# Patient Record
Sex: MALE | Race: WHITE | NOT HISPANIC OR LATINO
[De-identification: names, ages, dates, MRNs, and addresses within clinical notes are randomized per-mention and may not be internally consistent; named-entity substitution may affect disease eponyms.]

---

## 2021-01-25 ENCOUNTER — APPOINTMENT (OUTPATIENT)
Dept: PULMONOLOGY | Facility: CLINIC | Age: 81
End: 2021-01-25
Payer: MEDICARE

## 2021-01-25 VITALS
TEMPERATURE: 98.1 F | DIASTOLIC BLOOD PRESSURE: 79 MMHG | SYSTOLIC BLOOD PRESSURE: 167 MMHG | HEART RATE: 73 BPM | OXYGEN SATURATION: 93 %

## 2021-01-25 PROCEDURE — 99204 OFFICE O/P NEW MOD 45 MIN: CPT

## 2021-01-25 PROCEDURE — 99072 ADDL SUPL MATRL&STAF TM PHE: CPT

## 2021-01-25 RX ORDER — FOLIC ACID 1 MG/1
1 TABLET ORAL
Qty: 90 | Refills: 0 | Status: ACTIVE | COMMUNITY
Start: 2020-07-02

## 2021-01-25 RX ORDER — ROSUVASTATIN CALCIUM 20 MG/1
20 TABLET, FILM COATED ORAL
Qty: 90 | Refills: 0 | Status: ACTIVE | COMMUNITY
Start: 2020-12-18

## 2021-01-25 RX ORDER — TAMSULOSIN HYDROCHLORIDE 0.4 MG/1
0.4 CAPSULE ORAL
Qty: 90 | Refills: 0 | Status: ACTIVE | COMMUNITY
Start: 2020-05-06

## 2021-01-25 NOTE — ASSESSMENT
[FreeTextEntry1] : The patient is a 79 yo male with a hx heavy smoking and multiple vascular issues. The patient has been seen by Dr Masterson and there is concern whether his increased dyspnea from a  pulmonary or cardiac etiology  The patient will need a PFT to determine his pulmonary status and  a comparison to his last PFT. He had a bronchodilator response on the PFT in 2015 but he tried multiple inhalers that did not help him.

## 2021-01-25 NOTE — HISTORY OF PRESENT ILLNESS
[Former] : former [Never] : never [>= 30 pack years] : >= 30 pack years [TextBox_4] : the patient is 80 year M with SOB especially with exertion. He sees DR Masterson for moderate Aortic valve disease. The valve is slightly worse per Dr masterson.  Dr Masterson wants to have the patient have a resp evaluation.  The patient was a heavy smoker up to  1,.5 PPD for 50 yrs  He tried inhalers but they did not help  He has more MEMBRENO this year  He has claudication in the right leg  and he sees Dr LALY celaya He has a renal mass that Dr Rock is monitoring and he has had a triple CABG in [facility] by Juancho García 7 years ago   The patient has no occupational exposures as he worked with [employer]  He is s/p carotid surg 2 X on the left   15 yrs ago  He has an abd aortic aneurysm  He has no hx of a Ct of the chest  last PFt was 2015  [TextBox_11] : 1.5 [TextBox_13] : 50 [YearQuit] : 2013

## 2021-01-25 NOTE — REVIEW OF SYSTEMS
[Postnasal Drip] : postnasal drip [Edentulous] : edentulous [Cough] : no cough [Hemoptysis] : no hemoptysis [Chest Tightness] : no chest tightness [Sputum] : no sputum [Dyspnea] : dyspnea [SOB on Exertion] : sob on exertion [Claudication] : claudication [Nocturia] : nocturia [Frequency] : dysuria [Arthralgias] : arthralgias [Back Pain] : back pain [Diabetes] : no diabetes [Thyroid Problem] : no thyroid problem [Obesity] : obesity [Negative] : Gastrointestinal [TextBox_14] : no treating  He had a swallow test no aspiration  [TextBox_30] : c [TextBox_91] : hips and knees  [TextBox_101] : squamous MOHs on his right check  [TextBox_110] : w/u for low platelets now normal

## 2021-01-25 NOTE — PHYSICAL EXAM
[No Acute Distress] : no acute distress [Normal Appearance] : normal appearance [Normal Rate/Rhythm] : normal rate/rhythm [Murmur ___ / 6] : murmur [unfilled] / 6 [Normal S1, S2] : normal s1, s2 [No Resp Distress] : no resp distress [Clear to Auscultation Bilaterally] : clear to auscultation bilaterally [No Abnormalities] : no abnormalities [Benign] : benign [Normal Gait] : normal gait [No Clubbing] : no clubbing [No Cyanosis] : no cyanosis [No Edema] : no edema [FROM] : FROM [Normal Color/ Pigmentation] : normal color/ pigmentation [No Focal Deficits] : no focal deficits [Oriented x3] : oriented x3 [TextBox_54] : aortic murmur

## 2021-01-25 NOTE — REASON FOR VISIT
[Initial] : an initial visit [Emphysema] : emphysema [TextBox_44] : PT IS A 79 YO MALE WITH HX OF EMPHYSEMA AND CAD.  PT LAST SAW DR. SHEN IN 2016. PT HAS TRIED INHALERS IN PAST, BUT STATES THAT THEY DIDN'T REALLY WORK.  PT REFERRED BY CARDIO FOR EVALUATION. PT STATES THAT HE DOES GET WINDED MORE QUICKLY AT PRESENT.  PT  STATES THAT HE DOES HAVE COUGH - CLEARS THROAT IN AM - FEELS THAT "THROAT IS CLOSED".  PT STATES THAT HE OCCASIONALLY HAS WHEEZING IN THROAT. PT HAD SWALLOW STUDY IN PAST.  PT DENIES ANY FEVER, CHILLS, CHEST PAIN OR TIGHTNESS WITH BREATHING.

## 2021-02-09 ENCOUNTER — APPOINTMENT (OUTPATIENT)
Dept: PULMONOLOGY | Facility: CLINIC | Age: 81
End: 2021-02-09

## 2021-02-12 ENCOUNTER — APPOINTMENT (OUTPATIENT)
Dept: PULMONOLOGY | Facility: CLINIC | Age: 81
End: 2021-02-12
Payer: MEDICARE

## 2021-02-12 VITALS
TEMPERATURE: 97.8 F | SYSTOLIC BLOOD PRESSURE: 145 MMHG | DIASTOLIC BLOOD PRESSURE: 79 MMHG | HEART RATE: 75 BPM | OXYGEN SATURATION: 94 %

## 2021-02-12 DIAGNOSIS — Z85.828 PERSONAL HISTORY OF OTHER MALIGNANT NEOPLASM OF SKIN: ICD-10-CM

## 2021-02-12 DIAGNOSIS — Z82.49 FAMILY HISTORY OF ISCHEMIC HEART DISEASE AND OTHER DISEASES OF THE CIRCULATORY SYSTEM: ICD-10-CM

## 2021-02-12 DIAGNOSIS — Z80.49 FAMILY HISTORY OF MALIGNANT NEOPLASM OF OTHER GENITAL ORGANS: ICD-10-CM

## 2021-02-12 DIAGNOSIS — K86.2 CYST OF PANCREAS: ICD-10-CM

## 2021-02-12 DIAGNOSIS — Z87.891 PERSONAL HISTORY OF NICOTINE DEPENDENCE: ICD-10-CM

## 2021-02-12 DIAGNOSIS — I71.4 ABDOMINAL AORTIC ANEURYSM, W/OUT RUPTURE: ICD-10-CM

## 2021-02-12 DIAGNOSIS — N28.89 OTHER SPECIFIED DISORDERS OF KIDNEY AND URETER: ICD-10-CM

## 2021-02-12 PROCEDURE — 94060 EVALUATION OF WHEEZING: CPT

## 2021-02-12 PROCEDURE — 94729 DIFFUSING CAPACITY: CPT

## 2021-02-12 PROCEDURE — 99214 OFFICE O/P EST MOD 30 MIN: CPT | Mod: 25

## 2021-02-12 PROCEDURE — 94727 GAS DIL/WSHOT DETER LNG VOL: CPT

## 2021-02-12 PROCEDURE — 99072 ADDL SUPL MATRL&STAF TM PHE: CPT

## 2021-02-12 NOTE — PHYSICAL EXAM
[No Acute Distress] : no acute distress [Normal Appearance] : normal appearance [Normal Rate/Rhythm] : normal rate/rhythm [Normal S1, S2] : normal s1, s2 [No Resp Distress] : no resp distress [No Abnormalities] : no abnormalities [Benign] : benign [Normal Gait] : normal gait [No Clubbing] : no clubbing [No Cyanosis] : no cyanosis [No Edema] : no edema [FROM] : FROM [Normal Color/ Pigmentation] : normal color/ pigmentation [No Focal Deficits] : no focal deficits [Oriented x3] : oriented x3

## 2021-02-12 NOTE — HISTORY OF PRESENT ILLNESS
[Former] : former [Never] : never [TextBox_4] : the patient is 81 year M with SOB and has Emphysema. He has an extensive hx of smoking and cardiac/vascular disease. He also has COPD/emphysema and has been well compensated in the past. His cardiologist wants to know the extent of his lung disease and how much it is contributing to his dyapnea     [TextBox_11] : 1.5 [YearQuit] : 2014

## 2021-02-12 NOTE — REASON FOR VISIT
[Follow-Up] : a follow-up visit [COPD] : COPD [Shortness of Breath] : shortness of breath [TextBox_44] : PT is a 82 y/o male here for a PFT and evaluation. Recommended by his Cardiologist.  PT states that upon exertion experiences SOB, PT denies any other complaints when going over the review of symptoms. PT denies any COVID symptoms

## 2021-02-12 NOTE — ASSESSMENT
[FreeTextEntry1] : The patient had a PFT and there was a decline in his DLCO from 52/71 in 2015 to 44/56 today. He was asked to take a walk test and he quicklly desaturated to 83%  I told him he needs to have O2 therapy while sleeping at night and while exerting himself  He was quite perturbed by his predicament   I called his wife and explained the situation to her   He was walked again with2L of O2 and his SaO2 was maintained above 92%     His pulmonary impairment can account for his dyspnea

## 2021-04-09 ENCOUNTER — APPOINTMENT (OUTPATIENT)
Dept: PULMONOLOGY | Facility: CLINIC | Age: 81
End: 2021-04-09
Payer: MEDICARE

## 2021-04-09 VITALS
OXYGEN SATURATION: 93 % | HEART RATE: 73 BPM | DIASTOLIC BLOOD PRESSURE: 70 MMHG | TEMPERATURE: 97.4 F | SYSTOLIC BLOOD PRESSURE: 118 MMHG

## 2021-04-09 PROCEDURE — 99072 ADDL SUPL MATRL&STAF TM PHE: CPT

## 2021-04-09 PROCEDURE — 99213 OFFICE O/P EST LOW 20 MIN: CPT

## 2021-04-09 NOTE — ASSESSMENT
[FreeTextEntry1] : 1) continue O2  2) answered the patient's many questions regarding the interaction of his heart valve disease and  his lung disease and whether he has emphysema or Chronic bronchitis    He will need ct  to determine the degree of anatomical emphysema and cancer screening

## 2021-04-09 NOTE — HISTORY OF PRESENT ILLNESS
[Former] : former [Never] : never [Continuous] : Continuous [NC] : Nasal Cannula [Nightly] : Nightly [TextBox_4] : the patient is 81 year M with SOB and has Emphysema. He has an extensive hx of smoking and cardiac/vascular disease. He also has COPD/emphysema and has been well compensated in the past. His cardiologist wants to know the extent of his lung disease and how much it is contributing to his dyspnea    \par \par 4/9/21 The patient is doing better He is using the the O2 at this time  He has not had a recent ct scan    [TextBox_11] : 1.5 [YearQuit] : 2014 [FreeTextEntry1] : 2

## 2021-06-23 ENCOUNTER — APPOINTMENT (OUTPATIENT)
Dept: PULMONOLOGY | Facility: CLINIC | Age: 81
End: 2021-06-23
Payer: MEDICARE

## 2021-06-23 VITALS
HEART RATE: 68 BPM | SYSTOLIC BLOOD PRESSURE: 153 MMHG | DIASTOLIC BLOOD PRESSURE: 76 MMHG | TEMPERATURE: 96.5 F | OXYGEN SATURATION: 93 %

## 2021-06-23 DIAGNOSIS — Z23 ENCOUNTER FOR IMMUNIZATION: ICD-10-CM

## 2021-06-23 DIAGNOSIS — I77.89 OTHER SPECIFIED DISORDERS OF ARTERIES AND ARTERIOLES: ICD-10-CM

## 2021-06-23 PROCEDURE — 99214 OFFICE O/P EST MOD 30 MIN: CPT

## 2021-06-23 PROCEDURE — 99072 ADDL SUPL MATRL&STAF TM PHE: CPT

## 2021-06-23 RX ORDER — DOXYCYCLINE HYCLATE 100 MG/1
100 CAPSULE ORAL
Qty: 28 | Refills: 0 | Status: COMPLETED | COMMUNITY
Start: 2020-11-30 | End: 2021-06-23

## 2021-06-23 NOTE — HISTORY OF PRESENT ILLNESS
[Former] : former [Never] : never [Continuous] : Continuous [NC] : Nasal Cannula [Nightly] : Nightly [TextBox_4] : the patient is 81 year M with SOB and has Emphysema. He has an extensive hx of smoking and cardiac/vascular disease. He also has COPD/emphysema and has been well compensated in the past. His cardiologist wants to know the extent of his lung disease and how much it is contributing to his dyspnea    \par \par 4/9/21 The patient is doing better He is using the the O2 at this time  He has not had a recent ct scan   \par \par 6/23/21 the patient is doing well and has no new SX The patient has Dyspnea on exertion  He is accompanied by his wife  The patient was concerned about his last Ct chest results [TextBox_11] : 1.5 [YearQuit] : 2014 [FreeTextEntry1] : 2

## 2021-06-23 NOTE — ASSESSMENT
[FreeTextEntry1] :  1) COPD he has a DLCO of 44% and only mildly impaired timed volumes and flow rates. He is limited in exertion but has no cough, sputum , or wheezing  2) Vascular issues with aneurysms and claudication

## 2021-06-23 NOTE — REASON FOR VISIT
[Follow-Up] : a follow-up visit [COPD] : COPD [Shortness of Breath] : shortness of breath [Spouse] : spouse [TextBox_44] : 2 month follow up, CT scan results

## 2021-06-23 NOTE — REVIEW OF SYSTEMS
[Postnasal Drip] : postnasal drip [Edentulous] : edentulous [Cough] : no cough [Hemoptysis] : no hemoptysis [Chest Tightness] : no chest tightness [Frequent URIs] : no frequent URIs [Sputum] : no sputum [Dyspnea] : dyspnea [SOB on Exertion] : sob on exertion [Claudication] : claudication [Leg Cramps] : leg cramps [Nocturia] : nocturia [Frequency] : dysuria [Arthralgias] : arthralgias [Back Pain] : back pain [Diabetes] : no diabetes [Thyroid Problem] : no thyroid problem [Obesity] : obesity [Negative] : Gastrointestinal [TextBox_14] : no treating  He had a swallow test no aspiration  [TextBox_30] : dyspnea walking to the mailbox but not from the car to my office  [TextBox_44] : monitored by dr torres vascular  The cardiologist is Dr Masterson  [TextBox_91] : hips and knees  [TextBox_101] : squamous MOHs on his right check  [TextBox_110] : w/u for low platelets now normal

## 2021-06-23 NOTE — PHYSICAL EXAM
[Normal Rate/Rhythm] : normal rate/rhythm [Normal S1, S2] : normal s1, s2 [No Resp Distress] : no resp distress [Clear to Auscultation Bilaterally] : clear to auscultation bilaterally [Normal Gait] : normal gait [No Clubbing] : no clubbing [No Cyanosis] : no cyanosis [No Edema] : no edema [FROM] : FROM [No Focal Deficits] : no focal deficits [Oriented x3] : oriented x3

## 2021-11-02 ENCOUNTER — APPOINTMENT (OUTPATIENT)
Dept: PULMONOLOGY | Facility: CLINIC | Age: 81
End: 2021-11-02
Payer: MEDICARE

## 2021-11-02 VITALS
DIASTOLIC BLOOD PRESSURE: 70 MMHG | TEMPERATURE: 97.4 F | HEART RATE: 75 BPM | OXYGEN SATURATION: 96 % | WEIGHT: 200 LBS | BODY MASS INDEX: 31.39 KG/M2 | HEIGHT: 67 IN | SYSTOLIC BLOOD PRESSURE: 110 MMHG

## 2021-11-02 DIAGNOSIS — M51.37 OTHER INTERVERTEBRAL DISC DEGENERATION, LUMBOSACRAL REGION: ICD-10-CM

## 2021-11-02 DIAGNOSIS — Z00.00 ENCOUNTER FOR GENERAL ADULT MEDICAL EXAMINATION W/OUT ABNORMAL FINDINGS: ICD-10-CM

## 2021-11-02 DIAGNOSIS — M19.90 UNSPECIFIED OSTEOARTHRITIS, UNSPECIFIED SITE: ICD-10-CM

## 2021-11-02 PROCEDURE — 99213 OFFICE O/P EST LOW 20 MIN: CPT

## 2021-11-02 NOTE — PHYSICAL EXAM
[Normal Rate/Rhythm] : normal rate/rhythm [Normal S1, S2] : normal s1, s2 [No Resp Distress] : no resp distress [Clear to Auscultation Bilaterally] : clear to auscultation bilaterally [Normal Gait] : normal gait [No Clubbing] : no clubbing [No Cyanosis] : no cyanosis [No Edema] : no edema [FROM] : FROM [No Focal Deficits] : no focal deficits [Oriented x3] : oriented x3 [TextBox_54] : systolic murmur at the aortic area   [TextBox_68] : no wheeze or rhonchi

## 2021-11-02 NOTE — ASSESSMENT
[FreeTextEntry1] : 1) emphysema without significant airflow restriction however increasing decrease in DLCO\par 2) vascular problems aortic and abd no change in the chest on recent ct scan  \par 3) the patient urged to maintain some activity level and don't sit idly for long periods of time  \par 4) f/u in 6mos\par 5) ct scan in one yr    \par \par the patient was seen with his wife for 20minutes counselling, review and viewing his imaging, PE and medication( does't need any at this time

## 2021-11-02 NOTE — HISTORY OF PRESENT ILLNESS
[Former] : former [Never] : never [Continuous] : Continuous [NC] : Nasal Cannula [Nightly] : Nightly [TextBox_4] : the patient is 81 year M with SOB and has Emphysema. He has an extensive hx of smoking and cardiac/vascular disease. He also has COPD/emphysema and has been well compensated in the past. His cardiologist wants to know the extent of his lung disease and how much it is contributing to his dyspnea    \par \par 4/9/21 The patient is doing better He is using the the O2 at this time  He has not had a recent ct scan   \par \par 6/23/21 the patient is doing well and has no new SX The patient has Dyspnea on exertion  He is accompanied by his wife  The patient was concerned about his last Ct chest results\par \par 11/2/21  The patient has seen Levi chamorro for his back problems and there is no surgery but medical rx for arthritis He has some hip problems that he would see a orthopedic surgeon  The  patient uses O2 nocturnally  He is rather sedentary and I encouraged him to try and walk short distances frequently in his house and not sit for long periods of time    [TextBox_11] : 1.5 [YearQuit] : 2014 [FreeTextEntry1] : 2

## 2021-11-02 NOTE — REVIEW OF SYSTEMS
[Postnasal Drip] : postnasal drip [Edentulous] : edentulous [Dyspnea] : dyspnea [SOB on Exertion] : sob on exertion [Claudication] : claudication [Leg Cramps] : leg cramps [Nocturia] : nocturia [Frequency] : dysuria [Arthralgias] : arthralgias [Back Pain] : back pain [Obesity] : obesity [Negative] : Gastrointestinal [Cough] : no cough [Hemoptysis] : no hemoptysis [Chest Tightness] : no chest tightness [Frequent URIs] : no frequent URIs [Sputum] : no sputum [Diabetes] : no diabetes [Thyroid Problem] : no thyroid problem [TextBox_14] : no treating  He had a swallow test no aspiration  [TextBox_30] : dyspnea walking to the mailbox but not from the car to my office  [TextBox_44] : monitored by dr torres vascular  The cardiologist is Dr Masterson  [TextBox_91] : hips and knees  [TextBox_101] : squamous MOHs on his right check  [TextBox_110] : w/u for low platelets now normal

## 2021-11-02 NOTE — REASON FOR VISIT
[Follow-Up] : a follow-up visit [COPD] : COPD [Shortness of Breath] : shortness of breath [Spouse] : spouse [TextBox_44] : 4 months. Pt states that he gets winded with minimal activity. Pt wife states that he breathes through his mouth when sleeping with the O2 so it is not of much benefit and pt snores at night.

## 2021-11-05 ENCOUNTER — NON-APPOINTMENT (OUTPATIENT)
Age: 81
End: 2021-11-05

## 2022-03-02 ENCOUNTER — NON-APPOINTMENT (OUTPATIENT)
Age: 82
End: 2022-03-02

## 2022-05-04 ENCOUNTER — APPOINTMENT (OUTPATIENT)
Dept: PULMONOLOGY | Facility: CLINIC | Age: 82
End: 2022-05-04
Payer: MEDICARE

## 2022-05-04 VITALS
TEMPERATURE: 97 F | SYSTOLIC BLOOD PRESSURE: 127 MMHG | DIASTOLIC BLOOD PRESSURE: 71 MMHG | HEIGHT: 67 IN | OXYGEN SATURATION: 94 % | HEART RATE: 68 BPM | BODY MASS INDEX: 30.76 KG/M2 | WEIGHT: 196 LBS

## 2022-05-04 PROCEDURE — 99214 OFFICE O/P EST MOD 30 MIN: CPT

## 2022-05-04 NOTE — PHYSICAL EXAM
[Normal Rate/Rhythm] : normal rate/rhythm [Normal S1, S2] : normal s1, s2 [No Resp Distress] : no resp distress [Clear to Auscultation Bilaterally] : clear to auscultation bilaterally [Normal Gait] : normal gait [No Clubbing] : no clubbing [No Cyanosis] : no cyanosis [No Edema] : no edema [FROM] : FROM [No Focal Deficits] : no focal deficits [Oriented x3] : oriented x3 [No Acute Distress] : no acute distress [TextBox_54] : systolic murmur at the aortic area   [TextBox_68] : no wheeze or rhonchi

## 2022-05-04 NOTE — ASSESSMENT
[FreeTextEntry1] : 1) emphysema without significant airflow restriction however increasing decrease in DLCO\par 2) vascular problems aortic and abd no change in the chest on recent ct scan  \par 3) the patient urged to maintain some activity level and don't sit idly for long periods of time  \par 4) f/u in 6mos\par 5) ct scan in one yr    \par \par the patient was seen with his wife for 20minutes counselling, review and viewing his imaging, PE and medication( does't need any at this time \par \par 5/4/2022 1) patient has emphysema nonresponsive to inhalers on no medications.  2) the patient has back and vascular problems and is unable to exercise any significant degree.  3) the patient's oxygen nocturnally has close his wife to leave the bedroom to sleep elsewhere because of the noise the concentrator makes.  At this point we will try to confirm whether he does or does not need the nocturnal oxygen by getting a overnight oximetry test.  The patient and his wife were told that he must wear this without oxygen at night to find out what appropriate for his care time spent 30 minutes educating counseling physical exam history and review of imaging follow-up in 3 months

## 2022-05-04 NOTE — HISTORY OF PRESENT ILLNESS
[Former] : former [Never] : never [Continuous] : Continuous [NC] : Nasal Cannula [Nightly] : Nightly [TextBox_4] : the patient is 81 year M with SOB and has Emphysema. He has an extensive hx of smoking and cardiac/vascular disease. He also has COPD/emphysema and has been well compensated in the past. His cardiologist wants to know the extent of his lung disease and how much it is contributing to his dyspnea    \par \par 4/9/21 The patient is doing better He is using the the O2 at this time  He has not had a recent ct scan   \par \par 6/23/21 the patient is doing well and has no new SX The patient has Dyspnea on exertion  He is accompanied by his wife  The patient was concerned about his last Ct chest results\par \par 11/2/21  The patient has seen Levi Sexton for his back problems and there is no surgery but medical rx for arthritis He has some hip problems that he would see a orthopedic surgeon  The  patient uses O2 nocturnally  He is rather sedentary and I encouraged him to try and walk short distances frequently in his house and not sit for long periods of time   \par \par 5/4/2022 the patient states that his status is stable.  He is limited in activity by wife both by his lung disease and his back problems.  The patient is concerned about using the oxygen nocturnally his wife has had to leave the bedroom to sleep in another room because of the noise the oxygen concentrator makes.  Patient does have a DLCO 44% and his walk test last time his SaO2 went down to 83% on a short or walk [TextBox_11] : 1.5 [YearQuit] : 2014 [FreeTextEntry1] : 2

## 2022-05-04 NOTE — REASON FOR VISIT
[Follow-Up] : a follow-up visit [Cough] : cough [COPD] : COPD [Emphysema] : emphysema [Shortness of Breath] : shortness of breath [Wheezing] : wheezing [Spouse] : spouse [TextBox_44] : 6 month, CT review 4/19/22  Franko

## 2022-08-10 ENCOUNTER — APPOINTMENT (OUTPATIENT)
Dept: PULMONOLOGY | Facility: CLINIC | Age: 82
End: 2022-08-10

## 2022-08-10 VITALS
HEIGHT: 67 IN | TEMPERATURE: 97.9 F | WEIGHT: 196 LBS | DIASTOLIC BLOOD PRESSURE: 60 MMHG | HEART RATE: 73 BPM | BODY MASS INDEX: 30.76 KG/M2 | OXYGEN SATURATION: 93 % | SYSTOLIC BLOOD PRESSURE: 116 MMHG

## 2022-08-10 DIAGNOSIS — R09.89 OTHER SPECIFIED SYMPTOMS AND SIGNS INVOLVING THE CIRCULATORY AND RESPIRATORY SYSTEMS: ICD-10-CM

## 2022-08-10 PROCEDURE — 99214 OFFICE O/P EST MOD 30 MIN: CPT

## 2022-08-10 NOTE — PHYSICAL EXAM
[No Acute Distress] : no acute distress [Normal Rate/Rhythm] : normal rate/rhythm [Normal S1, S2] : normal s1, s2 [No Resp Distress] : no resp distress [Clear to Auscultation Bilaterally] : clear to auscultation bilaterally [Normal Gait] : normal gait [No Clubbing] : no clubbing [No Cyanosis] : no cyanosis [No Edema] : no edema [FROM] : FROM [No Focal Deficits] : no focal deficits [Oriented x3] : oriented x3 [TextBox_54] : systolic murmur at the aortic area   Has a right carotid bruit ( he had a bypass of the left carotid artery 20yrs ago)  [TextBox_68] : no wheeze or rhonchi

## 2022-08-10 NOTE — HISTORY OF PRESENT ILLNESS
[Former] : former [Never] : never [NC] : Nasal Cannula [Nightly] : Nightly [Continuous] : Continuous [TextBox_4] : the patient is 81 year M with SOB and has Emphysema. He has an extensive hx of smoking and cardiac/vascular disease. He also has COPD/emphysema and has been well compensated in the past. His cardiologist wants to know the extent of his lung disease and how much it is contributing to his dyspnea    \par \par 4/9/21 The patient is doing better He is using the the O2 at this time  He has not had a recent ct scan   \par \par 6/23/21 the patient is doing well and has no new SX The patient has Dyspnea on exertion  He is accompanied by his wife  The patient was concerned about his last Ct chest results\par \par 11/2/21  The patient has seen Levi Sexton for his back problems and there is no surgery but medical rx for arthritis He has some hip problems that he would see a orthopedic surgeon  The  patient uses O2 nocturnally  He is rather sedentary and I encouraged him to try and walk short distances frequently in his house and not sit for long periods of time   \par \par 5/4/2022 the patient states that his status is stable.  He is limited in activity by wife both by his lung disease and his back problems.  The patient is concerned about using the oxygen nocturnally his wife has had to leave the bedroom to sleep in another room because of the noise the oxygen concentrator makes.  Patient does have a DLCO 44% and his walk test last time his SaO2 went down to 83% on a short or walk\par \par 8/10/22 the patient has no interval events He still has not had an overnight SaO2 test to see if he needs the O2 nocturnally  [TextBox_11] : 1.5 [YearQuit] : 2014 [FreeTextEntry1] : 2

## 2022-08-10 NOTE — ASSESSMENT
[FreeTextEntry1] : 1) emphysema without significant airflow restriction however increasing decrease in DLCO\par 2) vascular problems aortic and abd no change in the chest on recent ct scan  \par 3) the patient urged to maintain some activity level and don't sit idly for long periods of time  \par 4) f/u in 6mos\par 5) ct scan in one yr    \par \par the patient was seen with his wife for 20minutes counselling, review and viewing his imaging, PE and medication( does't need any at this time \par \par 5/4/2022 1) patient has emphysema nonresponsive to inhalers on no medications.  2) the patient has back and vascular problems and is unable to exercise any significant degree.  3) the patient's oxygen nocturnally has close his wife to leave the bedroom to sleep elsewhere because of the noise the concentrator makes.  At this point we will try to confirm whether he does or does not need the nocturnal oxygen by getting a overnight oximetry test.  The patient and his wife were told that he must wear this without oxygen at night to find out what appropriate for his care time spent 30 minutes educating counseling physical exam history and review of imaging follow-up in 3 months\par \par 8/10/22 The patient is doing well not having any new sx He has not ye had his overnight oximetry testing   He will need the testing to determine his O2 requirement and qualify for a new perhaps a new machine.

## 2022-08-10 NOTE — REASON FOR VISIT
[Follow-Up] : a follow-up visit [Cough] : cough [COPD] : COPD [Shortness of Breath] : shortness of breath [Spouse] : spouse [TextBox_44] : 4 months.Pt complains of SOB with minimal activity as well as so indigestion issues that may be the cause of coughing.

## 2022-10-19 ENCOUNTER — NON-APPOINTMENT (OUTPATIENT)
Age: 82
End: 2022-10-19

## 2022-10-19 ENCOUNTER — RESULT REVIEW (OUTPATIENT)
Age: 82
End: 2022-10-19

## 2022-10-21 ENCOUNTER — NON-APPOINTMENT (OUTPATIENT)
Age: 82
End: 2022-10-21

## 2022-11-29 ENCOUNTER — APPOINTMENT (OUTPATIENT)
Dept: PULMONOLOGY | Facility: CLINIC | Age: 82
End: 2022-11-29

## 2022-11-29 VITALS
WEIGHT: 196 LBS | BODY MASS INDEX: 30.76 KG/M2 | HEART RATE: 71 BPM | HEIGHT: 67 IN | SYSTOLIC BLOOD PRESSURE: 140 MMHG | OXYGEN SATURATION: 93 % | DIASTOLIC BLOOD PRESSURE: 60 MMHG | TEMPERATURE: 97.6 F

## 2022-11-29 PROCEDURE — 99214 OFFICE O/P EST MOD 30 MIN: CPT

## 2022-11-29 NOTE — REASON FOR VISIT
[Follow-Up] : a follow-up visit [Cough] : cough [COPD] : COPD [Shortness of Breath] : shortness of breath [TextBox_44] : Patient was at [facility] in beginning of October. He had a rectal bleed. He was told to follow up.

## 2022-11-29 NOTE — ASSESSMENT
[FreeTextEntry1] : 1) emphysema without significant airflow restriction however increasing decrease in DLCO\par 2) vascular problems aortic and abd no change in the chest on recent ct scan  \par 3) the patient urged to maintain some activity level and don't sit idly for long periods of time  \par 4) f/u in 6mos\par 5) ct scan in one yr    \par \par the patient was seen with his wife for 20minutes counselling, review and viewing his imaging, PE and medication( does't need any at this time \par \par 5/4/2022 1) patient has emphysema nonresponsive to inhalers on no medications.  2) the patient has back and vascular problems and is unable to exercise any significant degree.  3) the patient's oxygen nocturnally has close his wife to leave the bedroom to sleep elsewhere because of the noise the concentrator makes.  At this point we will try to confirm whether he does or does not need the nocturnal oxygen by getting a overnight oximetry test.  The patient and his wife were told that he must wear this without oxygen at night to find out what appropriate for his care time spent 30 minutes educating counseling physical exam history and review of imaging follow-up in 3 months\par \par 8/10/22 The patient is doing well not having any new sx He has not ye had his overnight oximetry testing   He will need the testing to determine his O2 requirement and qualify for a new perhaps a new machine. \par \par 11/29/22The patient will need a Sleep study   He is to have another colonoscopy in January  to remove a polyp he will need pre op evaluation within a month of the procedure    f/u  in December   The second test on O2 there was much improved he will increase the FIO2 ingrid 3L /min and return in 4months   time spent 30minute   counseling, education, documentation, imaging reviewed, medication reviewed,  old records reviewed, HX and PE

## 2022-11-29 NOTE — HISTORY OF PRESENT ILLNESS
[Former] : former [Never] : never [Continuous] : Continuous [NC] : Nasal Cannula [Nightly] : Nightly [TextBox_4] : the patient is 81 year M with SOB and has Emphysema. He has an extensive hx of smoking and cardiac/vascular disease. He also has COPD/emphysema and has been well compensated in the past. His cardiologist wants to know the extent of his lung disease and how much it is contributing to his dyspnea    \par \par 4/9/21 The patient is doing better He is using the the O2 at this time  He has not had a recent ct scan   \par \par 6/23/21 the patient is doing well and has no new SX The patient has Dyspnea on exertion  He is accompanied by his wife  The patient was concerned about his last Ct chest results\par \par 11/2/21  The patient has seen Levi Sexton for his back problems and there is no surgery but medical rx for arthritis He has some hip problems that he would see a orthopedic surgeon  The  patient uses O2 nocturnally  He is rather sedentary and I encouraged him to try and walk short distances frequently in his house and not sit for long periods of time   \par \par 5/4/2022 the patient states that his status is stable.  He is limited in activity by wife both by his lung disease and his back problems.  The patient is concerned about using the oxygen nocturnally his wife has had to leave the bedroom to sleep in another room because of the noise the oxygen concentrator makes.  Patient does have a DLCO 44% and his walk test last time his SaO2 went down to 83% on a short or walk\par \par 8/10/22 the patient has no interval events He still has not had an overnight SaO2 test to see if he needs the O2 nocturnally \par \par 1111/29/22  the patient is doing well post hospital for GI bleeding  The patient is not have any additional complaints  He has had a overnight oximetry testing with 6 hours of 9 hours SaO2 < 88%   He was to get another test fwhile on O2   It was recommended for him to have a sleep study  [TextBox_11] : 1.5 [YearQuit] : 2014 [FreeTextEntry1] : 2

## 2023-01-16 ENCOUNTER — APPOINTMENT (OUTPATIENT)
Dept: PULMONOLOGY | Facility: CLINIC | Age: 83
End: 2023-01-16
Payer: MEDICARE

## 2023-01-16 VITALS
BODY MASS INDEX: 30.13 KG/M2 | HEIGHT: 67 IN | WEIGHT: 192 LBS | SYSTOLIC BLOOD PRESSURE: 135 MMHG | TEMPERATURE: 96.9 F | OXYGEN SATURATION: 97 % | DIASTOLIC BLOOD PRESSURE: 60 MMHG | HEART RATE: 65 BPM

## 2023-01-16 DIAGNOSIS — J44.9 CHRONIC OBSTRUCTIVE PULMONARY DISEASE, UNSPECIFIED: ICD-10-CM

## 2023-01-16 DIAGNOSIS — Z01.811 ENCOUNTER FOR PREPROCEDURAL RESPIRATORY EXAMINATION: ICD-10-CM

## 2023-01-16 PROCEDURE — 99214 OFFICE O/P EST MOD 30 MIN: CPT

## 2023-01-30 ENCOUNTER — RESULT REVIEW (OUTPATIENT)
Age: 83
End: 2023-01-30

## 2023-02-23 NOTE — ADDENDUM
[FreeTextEntry1] : 2/22/23 the patient has a cystoscopy  with transurethral resection of a bladder tumor   scheduled under general anesthesia  There is no pulmonary contraindication for the proposed surgery.  The patient has emphysema with mild impairment of air flow on his spirometry. However he has a very low DLcO of 44% c.w with severe emphysema   He will have a propensity to desaturate and close operative and stephane operative monitoring of his Rolan 2 is warranted.  There is a small increased risk of pulmonary complications    .

## 2023-02-23 NOTE — HISTORY OF PRESENT ILLNESS
[NC] : Nasal Cannula [Nightly] : Nightly [Continuous] : Continuous [TextBox_4] : the patient is 81 year M with SOB and has Emphysema. He has an extensive hx of smoking and cardiac/vascular disease. He also has COPD/emphysema and has been well compensated in the past. His cardiologist wants to know the extent of his lung disease and how much it is contributing to his dyspnea    \par \par 4/9/21 The patient is doing better He is using the the O2 at this time  He has not had a recent ct scan   \par \par 6/23/21 the patient is doing well and has no new SX The patient has Dyspnea on exertion  He is accompanied by his wife  The patient was concerned about his last Ct chest results\par \par 11/2/21  The patient has seen Levi Sexton for his back problems and there is no surgery but medical rx for arthritis He has some hip problems that he would see a orthopedic surgeon  The  patient uses O2 nocturnally  He is rather sedentary and I encouraged him to try and walk short distances frequently in his house and not sit for long periods of time   \par \par 5/4/2022 the patient states that his status is stable.  He is limited in activity by wife both by his lung disease and his back problems.  The patient is concerned about using the oxygen nocturnally his wife has had to leave the bedroom to sleep in another room because of the noise the oxygen concentrator makes.  Patient does have a DLCO 44% and his walk test last time his SaO2 went down to 83% on a short or walk\par \par 8/10/22 the patient has no interval events He still has not had an overnight SaO2 test to see if he needs the O2 nocturnally \par \par 11/29/22  the patient is doing well post hospital for GI bleeding  The patient is not have any additional complaints  He has had a overnight oximetry testing with 6 hours of 9 hours SaO2 < 88%   He was to get another test fwhile on O2   It was recommended for him to have a sleep study \par \par 1/16/23   The patient has emphysema and is pre op for a colonoscopy  He has a colonoscopy last fall  for rectal bleeding he had polyps but they could not be removed b/o A/c    He reports no new resp problems  He has O2 at 3L while sleeping  [FreeTextEntry1] : 2

## 2023-02-23 NOTE — REASON FOR VISIT
[Follow-Up] : a follow-up visit [COPD] : COPD [Shortness of Breath] : shortness of breath [TextBox_44] : pt is scheduled for colonoscopy and polyp removal on 1/30/23.

## 2023-02-23 NOTE — ASSESSMENT
[FreeTextEntry1] : 1) emphysema without significant airflow restriction however increasing decrease in DLCO\par 2) vascular problems aortic and abd no change in the chest on recent ct scan  \par 3) the patient urged to maintain some activity level and don't sit idly for long periods of time  \par 4) f/u in 6mos\par 5) ct scan in one yr    \par \par the patient was seen with his wife for 20minutes counselling, review and viewing his imaging, PE and medication( does't need any at this time \par \par 5/4/2022 1) patient has emphysema nonresponsive to inhalers on no medications.  2) the patient has back and vascular problems and is unable to exercise any significant degree.  3) the patient's oxygen nocturnally has close his wife to leave the bedroom to sleep elsewhere because of the noise the concentrator makes.  At this point we will try to confirm whether he does or does not need the nocturnal oxygen by getting a overnight oximetry test.  The patient and his wife were told that he must wear this without oxygen at night to find out what appropriate for his care time spent 30 minutes educating counseling physical exam history and review of imaging follow-up in 3 months\par \par 8/10/22 The patient is doing well not having any new sx He has not ye had his overnight oximetry testing   He will need the testing to determine his O2 requirement and qualify for a new perhaps a new machine. \par \par 11/29/22The patient will need a Sleep study   He is to have another colonoscopy in January  to remove a polyp he will need pre op evaluation within a month of the procedure    f/u  in December   The second test on O2 there was much improved he will increase the FIO2 ingrid 3L /min and return in 4months   time spent 30minute   counseling, education, documentation, imaging reviewed, medication reviewed,  old records reviewed, HX and PE \par \par 1/16/23 the patient has emphysema  and has a DLCO of 44%  He needs O2 nocturnally . His ventilatory function is only mildly impaired with a FEV1 of 66%    He has a moderate resp risk for hypoxemia   There is no pulmonary contraindication for the proposed colonoscopy under general anesthesia  time spent 30 minutes  counseling, education, documentation, imaging reviewed, medication reviewed,  old records reviewed, HX and PE

## 2023-03-06 ENCOUNTER — NON-APPOINTMENT (OUTPATIENT)
Age: 83
End: 2023-03-06

## 2023-06-20 ENCOUNTER — APPOINTMENT (OUTPATIENT)
Dept: PULMONOLOGY | Facility: CLINIC | Age: 83
End: 2023-06-20
Payer: MEDICARE

## 2023-06-20 VITALS
HEART RATE: 63 BPM | TEMPERATURE: 97.2 F | BODY MASS INDEX: 29.98 KG/M2 | HEIGHT: 67 IN | SYSTOLIC BLOOD PRESSURE: 122 MMHG | DIASTOLIC BLOOD PRESSURE: 76 MMHG | OXYGEN SATURATION: 98 % | WEIGHT: 191 LBS

## 2023-06-20 PROCEDURE — 99214 OFFICE O/P EST MOD 30 MIN: CPT

## 2023-07-27 NOTE — ADDENDUM
[FreeTextEntry1] : 7/26/23  the patient is due for a f/u cystoscopy in 8/16  he has no interval resp events  he has emphysema with normal spirometry and a low DLCO there should be concern for his oxygenation during the procedure  He has no pulmonary contraindication for the proposed cystoscopy  under conscious sedation, he does have a modest increased risk for pulmonary complications but still quite low in this low risk procedure .

## 2023-07-27 NOTE — REASON FOR VISIT
[Follow-Up] : a follow-up visit [COPD] : COPD [Emphysema] : emphysema [Shortness of Breath] : shortness of breath [Spouse] : spouse [TextBox_44] : 6 month.  Patient states he gets SOB very easy even with talking.

## 2023-07-27 NOTE — REVIEW OF SYSTEMS
[Postnasal Drip] : postnasal drip [Edentulous] : edentulous [Dyspnea] : dyspnea [SOB on Exertion] : sob on exertion [Claudication] : claudication [Leg Cramps] : leg cramps [Nocturia] : nocturia [Frequency] : dysuria [Arthralgias] : arthralgias [Back Pain] : back pain [Obesity] : obesity [Negative] : Gastrointestinal [Cough] : no cough [Hemoptysis] : no hemoptysis [Chest Tightness] : no chest tightness [Frequent URIs] : no frequent URIs [Sputum] : no sputum [Diabetes] : no diabetes [Thyroid Problem] : no thyroid problem [TextBox_14] : no treating  He had a swallow test no aspiration  [TextBox_44] : monitored by dr torres [facility]  The cardiologist is Dr Masterson  [TextBox_30] : dyspnea walking to the mailbox but not from the car to my office  [TextBox_91] : hips and knees  [TextBox_101] : squamous MOHs on his right check  [TextBox_110] : w/u for low platelets now normal

## 2023-07-27 NOTE — HISTORY OF PRESENT ILLNESS
[NC] : Nasal Cannula [Nightly] : Nightly [Continuous] : Continuous [FreeTextEntry1] : 2 [TextBox_4] : the patient is 81 year M with SOB and has Emphysema. He has an extensive hx of smoking and cardiac/vascular disease. He also has COPD/emphysema and has been well compensated in the past. His cardiologist wants to know the extent of his lung disease and how much it is contributing to his dyspnea    \par \par 4/9/21 The patient is doing better He is using the the O2 at this time  He has not had a recent ct scan   \par \par 6/23/21 the patient is doing well and has no new SX The patient has Dyspnea on exertion  He is accompanied by his wife  The patient was concerned about his last Ct chest results\par \par 11/2/21  The patient has seen Levi Sexton for his back problems and there is no surgery but medical rx for arthritis He has some hip problems that he would see a orthopedic surgeon  The  patient uses O2 nocturnally  He is rather sedentary and I encouraged him to try and walk short distances frequently in his house and not sit for long periods of time   \par \par 5/4/2022 the patient states that his status is stable.  He is limited in activity by wife both by his lung disease and his back problems.  The patient is concerned about using the oxygen nocturnally his wife has had to leave the bedroom to sleep in another room because of the noise the oxygen concentrator makes.  Patient does have a DLCO 44% and his walk test last time his SaO2 went down to 83% on a short or walk\par \par 8/10/22 the patient has no interval events He still has not had an overnight SaO2 test to see if he needs the O2 nocturnally \par \par 11/29/22  the patient is doing well post hospital for GI bleeding  The patient is not have any additional complaints  He has had a overnight oximetry testing with 6 hours of 9 hours SaO2 < 88%   He was to get another test fwhile on O2   It was recommended for him to have a sleep study \par \par 1/16/23   The patient has emphysema and is pre op for a colonoscopy  He has a colonoscopy last fall  for rectal bleeding he had polyps but they could not be removed b/o A/c    He reports no new resp problems  He has O2 at 3L while sleeping \par \par 6/20/23  The patient uses  his nocturnal O2 all the time  and he only occasionally  uses the portable O2  He had a low grade bladder cancer  on his last cystoscopy   [FreeTextEntry3] : a

## 2023-07-27 NOTE — ASSESSMENT
[FreeTextEntry1] : 1) emphysema without significant airflow restriction however increasing decrease in DLCO\par 2) vascular problems aortic and abd no change in the chest on recent ct scan  \par 3) the patient urged to maintain some activity level and don't sit idly for long periods of time  \par 4) f/u in 6mos\par 5) ct scan in one yr    \par \par the patient was seen with his wife for 20minutes counselling, review and viewing his imaging, PE and medication( does't need any at this time \par \par 5/4/2022 1) patient has emphysema nonresponsive to inhalers on no medications.  2) the patient has back and vascular problems and is unable to exercise any significant degree.  3) the patient's oxygen nocturnally has close his wife to leave the bedroom to sleep elsewhere because of the noise the concentrator makes.  At this point we will try to confirm whether he does or does not need the nocturnal oxygen by getting a overnight oximetry test.  The patient and his wife were told that he must wear this without oxygen at night to find out what appropriate for his care time spent 30 minutes educating counseling physical exam history and review of imaging follow-up in 3 months\par \par 8/10/22 The patient is doing well not having any new sx He has not ye had his overnight oximetry testing   He will need the testing to determine his O2 requirement and qualify for a new perhaps a new machine. \par \par 11/29/22The patient will need a Sleep study   He is to have another colonoscopy in January  to remove a polyp he will need pre op evaluation within a month of the procedure    f/u  in December   The second test on O2 there was much improved he will increase the FIO2 ingrid 3L /min and return in 4months   time spent 30minute   counseling, education, documentation, imaging reviewed, medication reviewed,  old records reviewed, HX and PE \par \par 1/16/23 the patient has emphysema  and has a DLCO of 44%  He needs O2 nocturnally . His ventilatory function is only mildly impaired with a FEV1 of 66%    He has a moderate resp risk for hypoxemia   There is no pulmonary contraindication for the proposed colonoscopy under general anesthesia  time spent 30 minutes  counseling, education, documentation, imaging reviewed, medication reviewed,  old records reviewed, HX and PE  \par \par 6/20/23  the patient is very limited and has desaturation of his SaO2 even when he talks He does not have much activity and has no Sx with the lower O2 sat while exerting himself     He will return in 4mos time spent 30 min  counseling, education, documentation, imaging reviewed, medication reviewed, old records reviewed, HX and PE

## 2023-09-25 ENCOUNTER — NON-APPOINTMENT (OUTPATIENT)
Age: 83
End: 2023-09-25

## 2023-09-29 ENCOUNTER — APPOINTMENT (OUTPATIENT)
Dept: PULMONOLOGY | Facility: CLINIC | Age: 83
End: 2023-09-29
Payer: MEDICARE

## 2023-09-29 VITALS
WEIGHT: 190 LBS | HEART RATE: 73 BPM | TEMPERATURE: 97.6 F | OXYGEN SATURATION: 97 % | SYSTOLIC BLOOD PRESSURE: 118 MMHG | DIASTOLIC BLOOD PRESSURE: 60 MMHG | BODY MASS INDEX: 29.82 KG/M2 | HEIGHT: 67 IN

## 2023-09-29 PROCEDURE — 99214 OFFICE O/P EST MOD 30 MIN: CPT

## 2023-09-29 RX ORDER — AMLODIPINE BESYLATE 10 MG/1
10 TABLET ORAL
Qty: 90 | Refills: 0 | Status: DISCONTINUED | COMMUNITY
Start: 2021-01-20 | End: 2023-09-29

## 2023-09-29 RX ORDER — ENALAPRIL MALEATE 5 MG/1
5 TABLET ORAL
Qty: 180 | Refills: 0 | Status: DISCONTINUED | COMMUNITY
Start: 2020-07-02 | End: 2023-09-29

## 2023-09-29 RX ORDER — PANTOPRAZOLE 40 MG/1
40 TABLET, DELAYED RELEASE ORAL
Qty: 30 | Refills: 0 | Status: DISCONTINUED | COMMUNITY
Start: 2022-10-20 | End: 2023-09-29

## 2023-09-29 RX ORDER — SODIUM PICOSULFATE, MAGNESIUM OXIDE, AND ANHYDROUS CITRIC ACID 10; 3.5; 12 MG/160ML; G/160ML; G/160ML
10-3.5-12 MG-GM LIQUID ORAL
Qty: 320 | Refills: 0 | Status: DISCONTINUED | COMMUNITY
Start: 2022-11-07 | End: 2023-09-29

## 2023-09-29 RX ORDER — ASPIRIN 81 MG
81 TABLET, DELAYED RELEASE (ENTERIC COATED) ORAL
Refills: 0 | Status: DISCONTINUED | COMMUNITY
End: 2023-09-29

## 2023-11-01 ENCOUNTER — APPOINTMENT (OUTPATIENT)
Dept: PULMONOLOGY | Facility: CLINIC | Age: 83
End: 2023-11-01

## 2023-11-06 ENCOUNTER — INPATIENT (INPATIENT)
Facility: HOSPITAL | Age: 83
LOS: 0 days | Discharge: HOME | DRG: 274 | End: 2023-11-07
Attending: INTERNAL MEDICINE | Admitting: INTERNAL MEDICINE
Payer: MEDICARE

## 2023-11-06 ENCOUNTER — TRANSCRIPTION ENCOUNTER (OUTPATIENT)
Age: 83
End: 2023-11-06

## 2023-11-06 VITALS
HEART RATE: 78 BPM | HEIGHT: 67 IN | OXYGEN SATURATION: 97 % | RESPIRATION RATE: 17 BRPM | DIASTOLIC BLOOD PRESSURE: 68 MMHG | SYSTOLIC BLOOD PRESSURE: 122 MMHG | WEIGHT: 190.04 LBS | TEMPERATURE: 98 F

## 2023-11-06 DIAGNOSIS — I48.91 UNSPECIFIED ATRIAL FIBRILLATION: ICD-10-CM

## 2023-11-06 DIAGNOSIS — Z95.1 PRESENCE OF AORTOCORONARY BYPASS GRAFT: Chronic | ICD-10-CM

## 2023-11-06 DIAGNOSIS — Z98.890 OTHER SPECIFIED POSTPROCEDURAL STATES: Chronic | ICD-10-CM

## 2023-11-06 LAB
ANION GAP SERPL CALC-SCNC: 13 MMOL/L — SIGNIFICANT CHANGE UP (ref 5–17)
ANION GAP SERPL CALC-SCNC: 13 MMOL/L — SIGNIFICANT CHANGE UP (ref 5–17)
BLD GP AB SCN SERPL QL: NEGATIVE — SIGNIFICANT CHANGE UP
BLD GP AB SCN SERPL QL: NEGATIVE — SIGNIFICANT CHANGE UP
BUN SERPL-MCNC: 22 MG/DL — SIGNIFICANT CHANGE UP (ref 7–23)
BUN SERPL-MCNC: 22 MG/DL — SIGNIFICANT CHANGE UP (ref 7–23)
CALCIUM SERPL-MCNC: 9.6 MG/DL — SIGNIFICANT CHANGE UP (ref 8.4–10.5)
CALCIUM SERPL-MCNC: 9.6 MG/DL — SIGNIFICANT CHANGE UP (ref 8.4–10.5)
CHLORIDE SERPL-SCNC: 101 MMOL/L — SIGNIFICANT CHANGE UP (ref 96–108)
CHLORIDE SERPL-SCNC: 101 MMOL/L — SIGNIFICANT CHANGE UP (ref 96–108)
CO2 SERPL-SCNC: 22 MMOL/L — SIGNIFICANT CHANGE UP (ref 22–31)
CO2 SERPL-SCNC: 22 MMOL/L — SIGNIFICANT CHANGE UP (ref 22–31)
CREAT SERPL-MCNC: 1.01 MG/DL — SIGNIFICANT CHANGE UP (ref 0.5–1.3)
CREAT SERPL-MCNC: 1.01 MG/DL — SIGNIFICANT CHANGE UP (ref 0.5–1.3)
EGFR: 75 ML/MIN/1.73M2 — SIGNIFICANT CHANGE UP
EGFR: 75 ML/MIN/1.73M2 — SIGNIFICANT CHANGE UP
GLUCOSE SERPL-MCNC: 104 MG/DL — HIGH (ref 70–99)
GLUCOSE SERPL-MCNC: 104 MG/DL — HIGH (ref 70–99)
HCT VFR BLD CALC: 45.2 % — SIGNIFICANT CHANGE UP (ref 39–50)
HCT VFR BLD CALC: 45.2 % — SIGNIFICANT CHANGE UP (ref 39–50)
HGB BLD-MCNC: 14.4 G/DL — SIGNIFICANT CHANGE UP (ref 13–17)
HGB BLD-MCNC: 14.4 G/DL — SIGNIFICANT CHANGE UP (ref 13–17)
MCHC RBC-ENTMCNC: 28.9 PG — SIGNIFICANT CHANGE UP (ref 27–34)
MCHC RBC-ENTMCNC: 28.9 PG — SIGNIFICANT CHANGE UP (ref 27–34)
MCHC RBC-ENTMCNC: 31.9 GM/DL — LOW (ref 32–36)
MCHC RBC-ENTMCNC: 31.9 GM/DL — LOW (ref 32–36)
MCV RBC AUTO: 90.8 FL — SIGNIFICANT CHANGE UP (ref 80–100)
MCV RBC AUTO: 90.8 FL — SIGNIFICANT CHANGE UP (ref 80–100)
NRBC # BLD: 0 /100 WBCS — SIGNIFICANT CHANGE UP (ref 0–0)
NRBC # BLD: 0 /100 WBCS — SIGNIFICANT CHANGE UP (ref 0–0)
PLATELET # BLD AUTO: 201 K/UL — SIGNIFICANT CHANGE UP (ref 150–400)
PLATELET # BLD AUTO: 201 K/UL — SIGNIFICANT CHANGE UP (ref 150–400)
POTASSIUM SERPL-MCNC: 4.7 MMOL/L — SIGNIFICANT CHANGE UP (ref 3.5–5.3)
POTASSIUM SERPL-MCNC: 4.7 MMOL/L — SIGNIFICANT CHANGE UP (ref 3.5–5.3)
POTASSIUM SERPL-SCNC: 4.7 MMOL/L — SIGNIFICANT CHANGE UP (ref 3.5–5.3)
POTASSIUM SERPL-SCNC: 4.7 MMOL/L — SIGNIFICANT CHANGE UP (ref 3.5–5.3)
RBC # BLD: 4.98 M/UL — SIGNIFICANT CHANGE UP (ref 4.2–5.8)
RBC # BLD: 4.98 M/UL — SIGNIFICANT CHANGE UP (ref 4.2–5.8)
RBC # FLD: 14.9 % — HIGH (ref 10.3–14.5)
RBC # FLD: 14.9 % — HIGH (ref 10.3–14.5)
RH IG SCN BLD-IMP: POSITIVE — SIGNIFICANT CHANGE UP
SODIUM SERPL-SCNC: 136 MMOL/L — SIGNIFICANT CHANGE UP (ref 135–145)
SODIUM SERPL-SCNC: 136 MMOL/L — SIGNIFICANT CHANGE UP (ref 135–145)
WBC # BLD: 8.9 K/UL — SIGNIFICANT CHANGE UP (ref 3.8–10.5)
WBC # BLD: 8.9 K/UL — SIGNIFICANT CHANGE UP (ref 3.8–10.5)
WBC # FLD AUTO: 8.9 K/UL — SIGNIFICANT CHANGE UP (ref 3.8–10.5)
WBC # FLD AUTO: 8.9 K/UL — SIGNIFICANT CHANGE UP (ref 3.8–10.5)

## 2023-11-06 PROCEDURE — 93657 TX L/R ATRIAL FIB ADDL: CPT

## 2023-11-06 PROCEDURE — 93655 ICAR CATH ABLTJ DSCRT ARRHYT: CPT

## 2023-11-06 PROCEDURE — 93010 ELECTROCARDIOGRAM REPORT: CPT

## 2023-11-06 PROCEDURE — 93656 COMPRE EP EVAL ABLTJ ATR FIB: CPT

## 2023-11-06 RX ORDER — FAMOTIDINE 10 MG/ML
1 INJECTION INTRAVENOUS
Refills: 0 | DISCHARGE

## 2023-11-06 RX ORDER — SPIRONOLACTONE 25 MG/1
25 TABLET, FILM COATED ORAL DAILY
Refills: 0 | Status: DISCONTINUED | OUTPATIENT
Start: 2023-11-06 | End: 2023-11-07

## 2023-11-06 RX ORDER — FINASTERIDE 5 MG/1
5 TABLET, FILM COATED ORAL DAILY
Refills: 0 | Status: DISCONTINUED | OUTPATIENT
Start: 2023-11-06 | End: 2023-11-07

## 2023-11-06 RX ORDER — APIXABAN 2.5 MG/1
5 TABLET, FILM COATED ORAL EVERY 12 HOURS
Refills: 0 | Status: DISCONTINUED | OUTPATIENT
Start: 2023-11-06 | End: 2023-11-07

## 2023-11-06 RX ORDER — PANTOPRAZOLE SODIUM 20 MG/1
40 TABLET, DELAYED RELEASE ORAL
Refills: 0 | Status: DISCONTINUED | OUTPATIENT
Start: 2023-11-06 | End: 2023-11-07

## 2023-11-06 RX ORDER — ATORVASTATIN CALCIUM 80 MG/1
80 TABLET, FILM COATED ORAL AT BEDTIME
Refills: 0 | Status: DISCONTINUED | OUTPATIENT
Start: 2023-11-06 | End: 2023-11-07

## 2023-11-06 RX ORDER — METOPROLOL TARTRATE 50 MG
25 TABLET ORAL THREE TIMES A DAY
Refills: 0 | Status: DISCONTINUED | OUTPATIENT
Start: 2023-11-06 | End: 2023-11-07

## 2023-11-06 RX ORDER — PANTOPRAZOLE SODIUM 20 MG/1
40 TABLET, DELAYED RELEASE ORAL ONCE
Refills: 0 | Status: COMPLETED | OUTPATIENT
Start: 2023-11-06 | End: 2023-11-06

## 2023-11-06 RX ORDER — LEVALBUTEROL 1.25 MG/.5ML
0.63 SOLUTION, CONCENTRATE RESPIRATORY (INHALATION) EVERY 6 HOURS
Refills: 0 | Status: DISCONTINUED | OUTPATIENT
Start: 2023-11-06 | End: 2023-11-07

## 2023-11-06 RX ORDER — BUDESONIDE AND FORMOTEROL FUMARATE DIHYDRATE 160; 4.5 UG/1; UG/1
2 AEROSOL RESPIRATORY (INHALATION)
Refills: 0 | Status: DISCONTINUED | OUTPATIENT
Start: 2023-11-06 | End: 2023-11-07

## 2023-11-06 RX ORDER — TAMSULOSIN HYDROCHLORIDE 0.4 MG/1
0.4 CAPSULE ORAL AT BEDTIME
Refills: 0 | Status: DISCONTINUED | OUTPATIENT
Start: 2023-11-06 | End: 2023-11-06

## 2023-11-06 RX ORDER — FOLIC ACID 0.8 MG
1 TABLET ORAL DAILY
Refills: 0 | Status: DISCONTINUED | OUTPATIENT
Start: 2023-11-06 | End: 2023-11-07

## 2023-11-06 RX ORDER — ACETAMINOPHEN 500 MG
1000 TABLET ORAL ONCE
Refills: 0 | Status: COMPLETED | OUTPATIENT
Start: 2023-11-06 | End: 2023-11-06

## 2023-11-06 RX ADMIN — APIXABAN 5 MILLIGRAM(S): 2.5 TABLET, FILM COATED ORAL at 23:27

## 2023-11-06 RX ADMIN — PANTOPRAZOLE SODIUM 40 MILLIGRAM(S): 20 TABLET, DELAYED RELEASE ORAL at 19:49

## 2023-11-06 RX ADMIN — Medication 25 MILLIGRAM(S): at 21:02

## 2023-11-06 RX ADMIN — Medication 1000 MILLIGRAM(S): at 22:51

## 2023-11-06 RX ADMIN — Medication 400 MILLIGRAM(S): at 22:21

## 2023-11-06 RX ADMIN — FINASTERIDE 5 MILLIGRAM(S): 5 TABLET, FILM COATED ORAL at 21:02

## 2023-11-06 RX ADMIN — ATORVASTATIN CALCIUM 80 MILLIGRAM(S): 80 TABLET, FILM COATED ORAL at 21:02

## 2023-11-06 NOTE — H&P CARDIOLOGY - NSICDXPASTSURGICALHX_GEN_ALL_CORE_FT
PAST SURGICAL HISTORY:  H/O transurethral resection of bladder tumor (TURBT)     History of CEA (carotid endarterectomy)     S/P CABG x 3

## 2023-11-06 NOTE — PATIENT PROFILE ADULT - FALL HARM RISK - HARM RISK INTERVENTIONS

## 2023-11-06 NOTE — CHART NOTE - NSCHARTNOTEFT_GEN_A_CORE
BRIEF PROCEDURE NOTE    MANSOOR SANTORO  [id number]    Pre-op Diagnosis: Atrial fibrillation    Post-op Diagnosis: Same    Procedure: EP study, Atrial Fibrillation Ablation    Electrophysiologist: Ramirez Ortiz MD    Assistant: Armando Ramos MD - fellow    Anesthesia: General Anesthesia    Access: R Femoral veins    Description:  8Fr sheath RFV: decapolar catheter in CS  9Fr sheath RFV: ICE  8Fr sheath RFV: Transeptal, Mapping/ablation    Findings:    inducible AVNRT s/p successful ablation  PVI, isolation confirmed with pacing  Intercarina line  CTI: ablation TV to IVC, bidirectional block demonstrated with differential pacing    Complications: none    EBL: 20cc    Disposition: to [facility]    Plan:  -Apixaban 5mg tonight at 11PM  -Bedrest til midnight  -suture removal 11pm  -d/c Plavix  -ECG  -PPI IV 40mg x1, then for 30 days

## 2023-11-06 NOTE — H&P CARDIOLOGY - NSICDXPASTMEDICALHX_GEN_ALL_CORE_FT
PAST MEDICAL HISTORY:  Afib     Bladder cancer     CAD (coronary artery disease)     COPD (chronic obstructive pulmonary disease)     H/O aortic valve stenosis     H/O carotid artery stenosis     History of abdominal aortic aneurysm (AAA)     History of aortic regurgitation     History of rectal bleeding     HLD (hyperlipidemia)     HTN (hypertension)     Renal mass      PAST MEDICAL HISTORY:  Afib     Bladder cancer     CAD (coronary artery disease)     COPD (chronic obstructive pulmonary disease)     H/O aortic valve stenosis     H/O carotid artery stenosis     History of abdominal aortic aneurysm (AAA)     History of aortic regurgitation     History of rectal bleeding     HLD (hyperlipidemia)     HTN (hypertension)     Pancreatic cyst     Renal mass

## 2023-11-06 NOTE — CHART NOTE - NSCHARTNOTEFT_GEN_A_CORE
s/p AFib ablation   bedrest until midnight   RFV suture to be removed at 2300  resume Eliquis at 2300   IV protonix X1   stop Plavix per Dr. Ortiz   avoid a agee placement due to bladder cancer - patient was able to void in recovery   Monitor overnight -resume medications from home/hospital

## 2023-11-06 NOTE — PRE-ANESTHESIA EVALUATION ADULT - NSANTHOSAYNRD_GEN_A_CORE
No. SHASTA screening performed.  STOP BANG Legend: 0-2 = LOW Risk; 3-4 = INTERMEDIATE Risk; 5-8 = HIGH Risk

## 2023-11-06 NOTE — H&P CARDIOLOGY - HISTORY OF PRESENT ILLNESS
84 y/o  male PMH CAD s/p 3V CABG (Nov 2012), Mod AR and Mod AS with EF 64% on March 2023 echo, HTN, HLD, Carotid stenosis s/p left CEA (2000), former smoker, COPD on home O2, small hemorrhagic renal mass, AAA f/b Dr. 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 Pt with mild PVC and mod right sided pleural effusion, lower lobe interstitial infiltrates (R>L) on inital CXR, treated with IV duresis. POOL likely 2/2 duresis, resolved. BCx2 negative. Repeat CXR showed normal pulm vasculature, mild B/L lower lobe atelectasis and pleural effusion improved. ECHO done 10/25 showed mild to mod reduced LV systolic function, RV systolic function reduced, RV systolic pressure suggested mild pulm HTN, mild to mod MR, mild MS, mild AR, mild AS, mild TR, no pericardial effusion, EF 37%.  S/P EVELIN and  DCCV at 120J (pt went into AT after 1 min in NSR) and DCCV at 150 J (went into AT after 30 secs in NSR and converted with Metoprolol 2.5mg IVx1) at [place] on 10/30/23. He had Tikosyn ordered to help maintain NSR but had prolonged QT and tachycardia.  Repeat ECHO done 11/2 showed normal LV size, EF 40-45%. Initial plan was for AVJ ablation and PPM but with repeat ECHO revealing normal size LA it was decided that pt would most benefit AF ablation. Transferred to [facility] for Afib ablation.     Pulm: Dr. Main [facility] 84 y/o  male PMH CAD s/p 3V CABG (Nov 2012), Mod AR and Mod AS with EF 64% on March 2023 echo, HTN, HLD, Carotid stenosis s/p left CEA (2000), former smoker, COPD on home O2, small hemorrhagic renal mass, AAA f/b Dr. Amanda, peripheral claudication, rectal bleeding, low grade bladder cancer s/p TURBT, pancreatic uncinate process cyst measuring up to 2.5cm followed by PCP (no change in size recent), with c/o SOB that is worse with exertion for 4-5 days prior to admission, admitted to [facility] on 10/23/23 with acute on chronic HFrEF and Afib with RVR. Pt with mild PVC and mod right sided pleural effusion, lower lobe interstitial infiltrates (R>L) on inital CXR, treated with IV duresis. POOL likely 2/2 duresis, resolved. BCx2 negative. Repeat CXR showed normal pulm vasculature, mild B/L lower lobe atelectasis and pleural effusion improved. ECHO done 10/25 showed mild to mod reduced LV systolic function, RV systolic function reduced, RV systolic pressure suggested mild pulm HTN, mild to mod MR, mild MS, mild AR, mild AS, mild TR, no pericardial effusion, EF 37%.  S/P EVELIN and  DCCV at 120J (pt went into AT after 1 min in NSR) and DCCV at 150 J (went into AT after 30 secs in NSR and converted with Metoprolol 2.5mg IVx1) at [place] on 10/30/23. He had Tikosyn ordered to help maintain NSR but had prolonged QT and tachycardia.  Repeat ECHO done 11/2 showed normal LV size, EF 40-45%. Initial plan was for AVJ ablation and PPM but with repeat ECHO revealing normal size LA it was decided that pt would most benefit AF ablation. Transferred to [facility] for Afib ablation.     Pulm: Dr. Main [facility] Pul[facility]ology 84 y/o  male PMH CAD s/p 3V CABG (Nov 2012), Mod AR and Mod AS with EF 64% on March 2023 echo, HTN, HLD, Carotid stenosis s/p left CEA (2000), former smoker, COPD on home O2, small hemorrhagic renal mass, AAA f/b Dr. Amanda, peripheral claudication, rectal bleeding, low grade bladder cancer s/p TURBT, pancreatic uncinate process cyst measuring up to 2.5cm followed by PCP (no change in size recent), with c/o SOB that is worse with exertion for 4-5 days prior to admission, admitted to [facility] on 10/23/23 with acute on chronic HFrEF and Afib with RVR. Pt with mild PVC and mod right sided pleural effusion, lower lobe interstitial infiltrates (R>L) on inital CXR, treated with IV duresis. POOL likely 2/2 duresis, resolved. BCx2 negative. Repeat CXR showed normal pulm vasculature, mild B/L lower lobe atelectasis and pleural effusion improved. ECHO done 10/25 showed mild to mod reduced LV systolic function, RV systolic function reduced, RV systolic pressure suggested mild pulm HTN, mild to mod MR, mild MS, mild AR, mild AS, mild TR, no pericardial effusion, EF 37%.  S/P EVELIN and  DCCV at 120J (pt went into AT after 1 min in NSR) and DCCV at 150 J (went into AT after 30 secs in NSR and converted with Metoprolol 2.5mg IVx1) at [place] on 10/30/23. He had Tikosyn ordered to help maintain NSR but had prolonged QT and tachycardia.  Repeat ECHO done 11/2 showed normal LV size, EF 40-45%. Initial plan was for AVJ ablation and PPM but with repeat ECHO revealing normal size LA it was decided that pt would most benefit AF ablation. Transferred to [facility] for Afib ablation.     Pulm: Dr. Main [facility] Pul[facility]ology 82 y/o  male PMH CAD s/p 3V CABG (Nov 2012), Mod AR and Mod AS with EF 64% on March 2023 echo, HTN, HLD, Carotid stenosis s/p left CEA (2000), former smoker, COPD on home O2, small hemorrhagic renal mass, AAA f/b Dr. Amanda, peripheral claudication, rectal bleeding, low grade bladder cancer s/p TURBT, pancreatic uncinate process cyst measuring up to 2.5cm followed by PCP (no change in size recent), with c/o SOB that is worse with exertion for 4-5 days prior to admission, admitted to [facility] on 10/23/23 with acute on chronic HFrEF and Afib with RVR. Pt with mild PVC and mod right sided pleural effusion, lower lobe interstitial infiltrates (R>L) on inital CXR, treated with IV duresis. POOL likely 2/2 duresis, resolved. BCx2 no growth. LE duplex negative for DVT. Hypotension during course of hospitalization treated with Midodrine; Tamsulosin held. Repeat CXR showed normal pulm vasculature, mild B/L lower lobe atelectasis and pleural effusion improved. ECHO done 10/25 showed mild to mod reduced LV systolic function, RV systolic function reduced, RV systolic pressure suggested mild pulm HTN, mild to mod MR, mild MS, mild AR, mild AS, mild TR, no pericardial effusion, EF 37%.  S/P EVELIN and  DCCV at 120J (pt went into AT after 1 min in NSR) and DCCV at 150 J (went into AT after 30 secs in NSR and converted with Metoprolol 2.5mg IVx1) at [place] on 10/30/23. He had Tikosyn ordered to help maintain NSR but had prolonged QT and tachycardia.  Repeat ECHO done 11/2 showed normal LV size, EF 40-45%. Initial plan was for AVJ ablation and PPM but with repeat ECHO revealing normal size LA it was decided that pt would most benefit AF ablation. Transferred to [facility] for Afib ablation.     Pulm: Dr. Main [facility] Pul[facility]ology 82 y/o  male PMH CAD s/p 3V CABG (Nov 2012), Mod AR and Mod AS with EF 64% on March 2023 echo, HTN, HLD, Carotid stenosis s/p left CEA (2000), former smoker, COPD on home O2, small hemorrhagic renal mass, AAA f/b Dr. 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 on 10/23/23 with acute on chronic HFrEF and Afib with RVR. Pt with mild PVC and mod right sided pleural effusion, lower lobe interstitial infiltrates (R>L) on inital CXR, treated with IV duresis. POOL likely 2/2 duresis, resolved. BCx2 no growth. LE duplex negative for DVT. Hypotension during course of hospitalization treated with Midodrine; Tamsulosin held. Repeat CXR showed normal pulm vasculature, mild B/L lower lobe atelectasis and pleural effusion improved. ECHO done 10/25 showed mild to mod reduced LV systolic function, RV systolic function reduced, RV systolic pressure suggested mild pulm HTN, mild to mod MR, mild MS, mild AR, mild AS, mild TR, no pericardial effusion, EF 37%.  S/P EVELIN and  DCCV at 120J (pt went into AT after 1 min in NSR) and DCCV at 150 J (went into AT after 30 secs in NSR and converted with Metoprolol 2.5mg IVx1) at [place] on 10/30/23. He had Tikosyn ordered to help maintain NSR but had prolonged QT and tachycardia.  Repeat ECHO done 11/2 showed normal LV size, EF 40-45%. Initial plan was for AVJ ablation and PPM but with repeat ECHO revealing normal size LA it was decided that pt would most benefit AF ablation. Transferred to [facility] for Afib ablation.     Pulm: Dr. Main [facility] Pul[facility]ology 82 y/o  male PMH CAD s/p 3V CABG (Nov 2012), Mod AR and Mod AS with EF 64% on March 2023 echo, HTN, HLD, Carotid stenosis s/p left CEA (2000), former smoker, COPD on home O2, small hemorrhagic renal mass, AAA f/b Dr. Amanda, peripheral claudication, rectal bleeding, low grade bladder cancer s/p TURBT, pancreatic uncinate process cyst measuring up to 2.5cm followed by PCP (no change in size recent), with c/o SOB that is worse with exertion for 4-5 days prior to admission, admitted to [facility] on 10/23/23 with acute on chronic HFrEF and Afib with RVR. Pt with mild PVC and mod right sided pleural effusion, lower lobe interstitial infiltrates (R>L) on inital CXR, treated with IV duresis. POOL likely 2/2 duresis, resolved. BCx2 no growth. LE duplex negative for DVT. Hypotension during course of hospitalization treated with Midodrine; Tamsulosin held. Repeat CXR showed normal pulm vasculature, mild B/L lower lobe atelectasis and pleural effusion improved. ECHO done 10/25 showed mild to mod reduced LV systolic function, RV systolic function reduced, RV systolic pressure suggested mild pulm HTN, mild to mod MR, mild MS, mild AR, mild AS, mild TR, no pericardial effusion, EF 37%.  S/P EVELIN and  DCCV at 120J (pt went into AT after 1 min in NSR) and DCCV at 150 J (went into AT after 30 secs in NSR and converted with Metoprolol 2.5mg IVx1) at [place] on 10/30/23. He had Tikosyn ordered to help maintain NSR but had prolonged QT and tachycardia.  Repeat ECHO done 11/2 showed normal LV size, EF 40-45%. Initial plan was for AVJ ablation and PPM but with repeat ECHO revealing normal size LA it was decided that pt would most benefit AF ablation. Transferred to [facility] for Afib ablation.     Pulm: Dr. Main [facility] Pul[facility]ology 84 y/o  male PMH CAD s/p 3V CABG (Nov 2012), Mod AR and Mod AS with EF 64% on March 2023 echo, HTN, HLD, Carotid stenosis s/p left CEA (2000), former smoker, COPD on home O2, small hemorrhagic renal mass, 3.7cm AAA f/b Dr. Amanda, peripheral claudication, rectal bleeding, low grade bladder cancer s/p TURBT, pancreatic uncinate process cyst measuring up to 2.5cm followed by PCP (no change in size recent), with c/o SOB that is worse with exertion for 4-5 days prior to admission, admitted to [facility] on 10/23/23 with acute on chronic HFrEF and Afib with RVR. Pt with mild PVC and mod right sided pleural effusion, lower lobe interstitial infiltrates (R>L) on inital CXR, treated with IV duresis. POOL likely 2/2 duresis, resolved. BCx2 no growth. LE duplex negative for DVT. Hypotension during course of hospitalization treated with Midodrine; Tamsulosin held. Repeat CXR showed normal pulm vasculature, mild B/L lower lobe atelectasis and pleural effusion improved. ECHO done 10/25 showed mild to mod reduced LV systolic function, RV systolic function reduced, RV systolic pressure suggested mild pulm HTN, mild to mod MR, mild MS, mild AR, mild AS, mild TR, no pericardial effusion, EF 37%.  S/P EVELIN and  DCCV at 120J (pt went into AT after 1 min in NSR) and DCCV at 150 J (went into AT after 30 secs in NSR and converted with Metoprolol 2.5mg IVx1) at [place] on 10/30/23. He had Tikosyn ordered to help maintain NSR but had prolonged QT and tachycardia.  Repeat ECHO done 11/2 showed normal LV size, EF 40-45%. Initial plan was for AVJ ablation and PPM but with repeat ECHO revealing normal size LA it was decided that pt would most benefit AF ablation. Transferred to [facility] for Afib ablation.     Pulm: Dr. Main [facility] Pul[facility]ology 82 y/o  male PMH CAD s/p 3V CABG (Nov 2012), Mod AR and Mod AS with EF 64% on March 2023 echo, HTN, HLD, Carotid stenosis s/p left CEA (2000), former smoker, COPD on home O2, small hemorrhagic renal mass, 3.7cm AAA f/b Dr. 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 on 10/23/23 with acute on chronic HFrEF and Afib with RVR. Pt with mild PVC and mod right sided pleural effusion, lower lobe interstitial infiltrates (R>L) on inital CXR, treated with IV duresis. POOL likely 2/2 duresis, resolved. BCx2 no growth. LE duplex negative for DVT. Hypotension during course of hospitalization treated with Midodrine; Tamsulosin held. Repeat CXR showed normal pulm vasculature, mild B/L lower lobe atelectasis and pleural effusion improved. ECHO done 10/25 showed mild to mod reduced LV systolic function, RV systolic function reduced, RV systolic pressure suggested mild pulm HTN, mild to mod MR, mild MS, mild AR, mild AS, mild TR, no pericardial effusion, EF 37%.  S/P EVELIN and  DCCV at 120J (pt went into AT after 1 min in NSR) and DCCV at 150 J (went into AT after 30 secs in NSR and converted with Metoprolol 2.5mg IVx1) at [place] on 10/30/23. He had Tikosyn ordered to help maintain NSR but had prolonged QT and tachycardia.  Repeat ECHO done 11/2 showed normal LV size, EF 40-45%. Initial plan was for AVJ ablation and PPM but with repeat ECHO revealing normal size LA it was decided that pt would most benefit AF ablation. Transferred to [facility] for Afib ablation.     Pulm: Dr. Main [facility] Pul[facility]ology 84 y/o  male PMH CAD s/p 3V CABG (Nov 2012), Mod AR and Mod AS with EF 64% on March 2023 echo, HTN, HLD, Carotid stenosis s/p left CEA (2000), former smoker, COPD on home O2, small hemorrhagic renal mass, 3.7cm AAA f/b Dr. Amanda, peripheral claudication, rectal bleeding, low grade bladder cancer s/p TURBT, pancreatic uncinate process cyst measuring up to 2.5cm followed by PCP (no change in size recent), with c/o SOB that is worse with exertion for 4-5 days prior to admission, admitted to [facility] on 10/23/23 with acute on chronic HFrEF and Afib with RVR. Pt with mild PVC and mod right sided pleural effusion, lower lobe interstitial infiltrates (R>L) on inital CXR, treated with IV duresis. POOL likely 2/2 duresis, resolved. BCx2 no growth. LE duplex negative for DVT. Hypotension during course of hospitalization treated with Midodrine; Tamsulosin held. Repeat CXR showed normal pulm vasculature, mild B/L lower lobe atelectasis and pleural effusion improved. ECHO done 10/25 showed mild to mod reduced LV systolic function, RV systolic function reduced, RV systolic pressure suggested mild pulm HTN, mild to mod MR, mild MS, mild AR, mild AS, mild TR, no pericardial effusion, EF 37%.  S/P EVELIN and  DCCV at 120J (pt went into AT after 1 min in NSR) and DCCV at 150 J (went into AT after 30 secs in NSR and converted with Metoprolol 2.5mg IVx1) at [place] on 10/30/23. He had Tikosyn ordered to help maintain NSR but had prolonged QT and tachycardia.  Repeat ECHO done 11/2 showed normal LV size, EF 40-45%. Initial plan was for AVJ ablation and PPM but with repeat ECHO revealing normal size LA it was decided that pt would most benefit AF ablation. Transferred to [facility] for Afib ablation.     Pulm: Dr. Main [facility]

## 2023-11-06 NOTE — H&P CARDIOLOGY - RS GEN PE MLT RESP DETAILS PC
airway patent/no rales/no rhonchi/no wheezes/diminished breath sounds, L/diminished breath sounds, R

## 2023-11-07 ENCOUNTER — TRANSCRIPTION ENCOUNTER (OUTPATIENT)
Age: 83
End: 2023-11-07

## 2023-11-07 VITALS
RESPIRATION RATE: 17 BRPM | TEMPERATURE: 98 F | SYSTOLIC BLOOD PRESSURE: 115 MMHG | OXYGEN SATURATION: 94 % | HEART RATE: 75 BPM | DIASTOLIC BLOOD PRESSURE: 56 MMHG

## 2023-11-07 DIAGNOSIS — I10 ESSENTIAL (PRIMARY) HYPERTENSION: ICD-10-CM

## 2023-11-07 DIAGNOSIS — E78.5 HYPERLIPIDEMIA, UNSPECIFIED: ICD-10-CM

## 2023-11-07 DIAGNOSIS — I48.91 UNSPECIFIED ATRIAL FIBRILLATION: ICD-10-CM

## 2023-11-07 PROBLEM — Z87.19 PERSONAL HISTORY OF OTHER DISEASES OF THE DIGESTIVE SYSTEM: Chronic | Status: ACTIVE | Noted: 2023-11-06

## 2023-11-07 PROBLEM — Z86.79 PERSONAL HISTORY OF OTHER DISEASES OF THE CIRCULATORY SYSTEM: Chronic | Status: ACTIVE | Noted: 2023-11-06

## 2023-11-07 PROBLEM — J44.9 CHRONIC OBSTRUCTIVE PULMONARY DISEASE, UNSPECIFIED: Chronic | Status: ACTIVE | Noted: 2023-11-06

## 2023-11-07 PROBLEM — N28.89 OTHER SPECIFIED DISORDERS OF KIDNEY AND URETER: Chronic | Status: ACTIVE | Noted: 2023-11-06

## 2023-11-07 PROBLEM — I25.10 ATHEROSCLEROTIC HEART DISEASE OF NATIVE CORONARY ARTERY WITHOUT ANGINA PECTORIS: Chronic | Status: ACTIVE | Noted: 2023-11-06

## 2023-11-07 PROBLEM — K86.2 CYST OF PANCREAS: Chronic | Status: ACTIVE | Noted: 2023-11-06

## 2023-11-07 PROBLEM — C67.9 MALIGNANT NEOPLASM OF BLADDER, UNSPECIFIED: Chronic | Status: ACTIVE | Noted: 2023-11-06

## 2023-11-07 LAB
ANION GAP SERPL CALC-SCNC: 13 MMOL/L — SIGNIFICANT CHANGE UP (ref 5–17)
ANION GAP SERPL CALC-SCNC: 13 MMOL/L — SIGNIFICANT CHANGE UP (ref 5–17)
BUN SERPL-MCNC: 20 MG/DL — SIGNIFICANT CHANGE UP (ref 7–23)
BUN SERPL-MCNC: 20 MG/DL — SIGNIFICANT CHANGE UP (ref 7–23)
CALCIUM SERPL-MCNC: 8.9 MG/DL — SIGNIFICANT CHANGE UP (ref 8.4–10.5)
CALCIUM SERPL-MCNC: 8.9 MG/DL — SIGNIFICANT CHANGE UP (ref 8.4–10.5)
CHLORIDE SERPL-SCNC: 102 MMOL/L — SIGNIFICANT CHANGE UP (ref 96–108)
CHLORIDE SERPL-SCNC: 102 MMOL/L — SIGNIFICANT CHANGE UP (ref 96–108)
CO2 SERPL-SCNC: 21 MMOL/L — LOW (ref 22–31)
CO2 SERPL-SCNC: 21 MMOL/L — LOW (ref 22–31)
CREAT SERPL-MCNC: 0.97 MG/DL — SIGNIFICANT CHANGE UP (ref 0.5–1.3)
CREAT SERPL-MCNC: 0.97 MG/DL — SIGNIFICANT CHANGE UP (ref 0.5–1.3)
EGFR: 78 ML/MIN/1.73M2 — SIGNIFICANT CHANGE UP
EGFR: 78 ML/MIN/1.73M2 — SIGNIFICANT CHANGE UP
GLUCOSE SERPL-MCNC: 89 MG/DL — SIGNIFICANT CHANGE UP (ref 70–99)
GLUCOSE SERPL-MCNC: 89 MG/DL — SIGNIFICANT CHANGE UP (ref 70–99)
HCT VFR BLD CALC: 42.4 % — SIGNIFICANT CHANGE UP (ref 39–50)
HCT VFR BLD CALC: 42.4 % — SIGNIFICANT CHANGE UP (ref 39–50)
HGB BLD-MCNC: 13.5 G/DL — SIGNIFICANT CHANGE UP (ref 13–17)
HGB BLD-MCNC: 13.5 G/DL — SIGNIFICANT CHANGE UP (ref 13–17)
MCHC RBC-ENTMCNC: 29.3 PG — SIGNIFICANT CHANGE UP (ref 27–34)
MCHC RBC-ENTMCNC: 29.3 PG — SIGNIFICANT CHANGE UP (ref 27–34)
MCHC RBC-ENTMCNC: 31.8 GM/DL — LOW (ref 32–36)
MCHC RBC-ENTMCNC: 31.8 GM/DL — LOW (ref 32–36)
MCV RBC AUTO: 92 FL — SIGNIFICANT CHANGE UP (ref 80–100)
MCV RBC AUTO: 92 FL — SIGNIFICANT CHANGE UP (ref 80–100)
NRBC # BLD: 0 /100 WBCS — SIGNIFICANT CHANGE UP (ref 0–0)
NRBC # BLD: 0 /100 WBCS — SIGNIFICANT CHANGE UP (ref 0–0)
PLATELET # BLD AUTO: 153 K/UL — SIGNIFICANT CHANGE UP (ref 150–400)
PLATELET # BLD AUTO: 153 K/UL — SIGNIFICANT CHANGE UP (ref 150–400)
POTASSIUM SERPL-MCNC: 4.4 MMOL/L — SIGNIFICANT CHANGE UP (ref 3.5–5.3)
POTASSIUM SERPL-MCNC: 4.4 MMOL/L — SIGNIFICANT CHANGE UP (ref 3.5–5.3)
POTASSIUM SERPL-SCNC: 4.4 MMOL/L — SIGNIFICANT CHANGE UP (ref 3.5–5.3)
POTASSIUM SERPL-SCNC: 4.4 MMOL/L — SIGNIFICANT CHANGE UP (ref 3.5–5.3)
RBC # BLD: 4.61 M/UL — SIGNIFICANT CHANGE UP (ref 4.2–5.8)
RBC # BLD: 4.61 M/UL — SIGNIFICANT CHANGE UP (ref 4.2–5.8)
RBC # FLD: 15.1 % — HIGH (ref 10.3–14.5)
RBC # FLD: 15.1 % — HIGH (ref 10.3–14.5)
SODIUM SERPL-SCNC: 136 MMOL/L — SIGNIFICANT CHANGE UP (ref 135–145)
SODIUM SERPL-SCNC: 136 MMOL/L — SIGNIFICANT CHANGE UP (ref 135–145)
WBC # BLD: 10.33 K/UL — SIGNIFICANT CHANGE UP (ref 3.8–10.5)
WBC # BLD: 10.33 K/UL — SIGNIFICANT CHANGE UP (ref 3.8–10.5)
WBC # FLD AUTO: 10.33 K/UL — SIGNIFICANT CHANGE UP (ref 3.8–10.5)
WBC # FLD AUTO: 10.33 K/UL — SIGNIFICANT CHANGE UP (ref 3.8–10.5)

## 2023-11-07 PROCEDURE — 99238 HOSP IP/OBS DSCHRG MGMT 30/<: CPT

## 2023-11-07 PROCEDURE — 93010 ELECTROCARDIOGRAM REPORT: CPT

## 2023-11-07 RX ORDER — ROSUVASTATIN CALCIUM 5 MG/1
1 TABLET ORAL
Refills: 0 | DISCHARGE

## 2023-11-07 RX ORDER — LEVALBUTEROL 1.25 MG/.5ML
3 SOLUTION, CONCENTRATE RESPIRATORY (INHALATION)
Refills: 0 | DISCHARGE

## 2023-11-07 RX ORDER — BUDESONIDE AND FORMOTEROL FUMARATE DIHYDRATE 160; 4.5 UG/1; UG/1
2 AEROSOL RESPIRATORY (INHALATION)
Refills: 0 | DISCHARGE

## 2023-11-07 RX ORDER — APIXABAN 2.5 MG/1
1 TABLET, FILM COATED ORAL
Qty: 0 | Refills: 0 | DISCHARGE

## 2023-11-07 RX ORDER — CLOPIDOGREL BISULFATE 75 MG/1
1 TABLET, FILM COATED ORAL
Qty: 0 | Refills: 0 | DISCHARGE

## 2023-11-07 RX ORDER — METOPROLOL TARTRATE 50 MG
1 TABLET ORAL
Refills: 0 | DISCHARGE

## 2023-11-07 RX ORDER — FAMOTIDINE 10 MG/ML
1 INJECTION INTRAVENOUS
Refills: 0 | DISCHARGE

## 2023-11-07 RX ORDER — METOPROLOL TARTRATE 50 MG
1 TABLET ORAL
Qty: 90 | Refills: 0
Start: 2023-11-07 | End: 2024-02-04

## 2023-11-07 RX ORDER — ACETAMINOPHEN 500 MG
1000 TABLET ORAL ONCE
Refills: 0 | Status: COMPLETED | OUTPATIENT
Start: 2023-11-07 | End: 2023-11-07

## 2023-11-07 RX ORDER — FINASTERIDE 5 MG/1
1 TABLET, FILM COATED ORAL
Refills: 0 | DISCHARGE

## 2023-11-07 RX ORDER — LANOLIN ALCOHOL/MO/W.PET/CERES
1 CREAM (GRAM) TOPICAL
Refills: 0 | DISCHARGE

## 2023-11-07 RX ORDER — SPIRONOLACTONE 25 MG/1
1 TABLET, FILM COATED ORAL
Qty: 0 | Refills: 0 | DISCHARGE
Start: 2023-11-07

## 2023-11-07 RX ORDER — FOLIC ACID 0.8 MG
1 TABLET ORAL
Refills: 0 | DISCHARGE

## 2023-11-07 RX ORDER — FINASTERIDE 5 MG/1
1 TABLET, FILM COATED ORAL
Qty: 0 | Refills: 0 | DISCHARGE

## 2023-11-07 RX ORDER — TAMSULOSIN HYDROCHLORIDE 0.4 MG/1
1 CAPSULE ORAL
Qty: 0 | Refills: 0 | DISCHARGE

## 2023-11-07 RX ORDER — PANTOPRAZOLE SODIUM 20 MG/1
1 TABLET, DELAYED RELEASE ORAL
Qty: 30 | Refills: 0
Start: 2023-11-07 | End: 2023-12-06

## 2023-11-07 RX ORDER — FOLIC ACID 0.8 MG
1 TABLET ORAL
Qty: 0 | Refills: 0 | DISCHARGE
Start: 2023-11-07

## 2023-11-07 RX ORDER — SPIRONOLACTONE 25 MG/1
1 TABLET, FILM COATED ORAL
Qty: 30 | Refills: 0
Start: 2023-11-07 | End: 2023-12-06

## 2023-11-07 RX ORDER — ROSUVASTATIN CALCIUM 5 MG/1
1 TABLET ORAL
Qty: 0 | Refills: 0 | DISCHARGE

## 2023-11-07 RX ORDER — SPIRONOLACTONE 25 MG/1
1 TABLET, FILM COATED ORAL
Refills: 0 | DISCHARGE

## 2023-11-07 RX ORDER — METOPROLOL TARTRATE 50 MG
1 TABLET ORAL
Qty: 0 | Refills: 0 | DISCHARGE
Start: 2023-11-07

## 2023-11-07 RX ADMIN — Medication 400 MILLIGRAM(S): at 02:17

## 2023-11-07 RX ADMIN — APIXABAN 5 MILLIGRAM(S): 2.5 TABLET, FILM COATED ORAL at 10:30

## 2023-11-07 RX ADMIN — Medication 5 MILLIGRAM(S): at 05:14

## 2023-11-07 RX ADMIN — Medication 25 MILLIGRAM(S): at 05:14

## 2023-11-07 RX ADMIN — PANTOPRAZOLE SODIUM 40 MILLIGRAM(S): 20 TABLET, DELAYED RELEASE ORAL at 05:14

## 2023-11-07 RX ADMIN — SPIRONOLACTONE 25 MILLIGRAM(S): 25 TABLET, FILM COATED ORAL at 05:14

## 2023-11-07 RX ADMIN — Medication 1000 MILLIGRAM(S): at 02:47

## 2023-11-07 NOTE — DISCHARGE NOTE PROVIDER - NSDCCPTREATMENT_GEN_ALL_CORE_FT
PRINCIPAL PROCEDURE  Procedure: Intracardiac catheter ablation for atrial fibrillation  Findings and Treatment: Atrial fib ablation via right femoral

## 2023-11-07 NOTE — PROGRESS NOTE ADULT - SUBJECTIVE AND OBJECTIVE BOX
HPI:  84 y/o  male PMH CAD s/p 3V CABG (Nov 2012), Mod AR and Mod AS with EF 64% on March 2023 echo, HTN, HLD, Carotid stenosis s/p left CEA (2000), former smoker, COPD on home O2, small hemorrhagic renal mass, 3.7cm AAA f/b Dr. Amanda, peripheral claudication, rectal bleeding, low grade bladder cancer s/p TURBT, pancreatic uncinate process cyst measuring up to 2.5cm followed by PCP (no change in size recent), with c/o SOB that is worse with exertion for 4-5 days prior to admission, admitted to [facility] on 10/23/23 with acute on chronic HFrEF and Afib with RVR. Pt with mild PVC and mod right sided pleural effusion, lower lobe interstitial infiltrates (R>L) on inital CXR, treated with IV duresis. POOL likely 2/2 duresis, resolved. BCx2 no growth. LE duplex negative for DVT. Hypotension during course of hospitalization treated with Midodrine; Tamsulosin held. Repeat CXR showed normal pulm vasculature, mild B/L lower lobe atelectasis and pleural effusion improved. ECHO done 10/25 showed mild to mod reduced LV systolic function, RV systolic function reduced, RV systolic pressure suggested mild pulm HTN, mild to mod MR, mild MS, mild AR, mild AS, mild TR, no pericardial effusion, EF 37%.  S/P EVELIN and  DCCV at 120J (pt went into AT after 1 min in NSR) and DCCV at 150 J (went into AT after 30 secs in NSR and converted with Metoprolol 2.5mg IVx1) at [place] on 10/30/23. He had Tikosyn ordered to help maintain NSR but had prolonged QT and tachycardia.  Repeat ECHO done 11/2 showed normal LV size, EF 40-45%. Initial plan was for AVJ ablation and PPM but with repeat ECHO revealing normal size LA it was decided that pt would most benefit AF ablation. Transferred to [facility] for Afib ablation.     Pulm: Dr. Main [facility] (06 Nov 2023 11:33)    Patient is a 83y old  Male who presents with a chief complaint of Atrial fibrillation (07 Nov 2023 00:51)          Allergies    Cardizem (Unknown)    Intolerances        Medications:  apixaban 5 milliGRAM(s) Oral every 12 hours  atorvastatin 80 milliGRAM(s) Oral at bedtime  budesonide 160 MICROgram(s)/formoterol 4.5 MICROgram(s) Inhaler 2 Puff(s) Inhalation two times a day  enalapril 5 milliGRAM(s) Oral daily  finasteride 5 milliGRAM(s) Oral daily  folic acid 1 milliGRAM(s) Oral daily  levalbuterol Inhalation 0.63 milliGRAM(s) Inhalation every 6 hours PRN  metoprolol tartrate 25 milliGRAM(s) Oral three times a day  pantoprazole    Tablet 40 milliGRAM(s) Oral before breakfast  spironolactone 25 milliGRAM(s) Oral daily      Vitals:  T(C): 36.7 (11-07-23 @ 00:30), Max: 36.7 (11-07-23 @ 00:30)  HR: 69 (11-07-23 @ 00:30) (69 - 80)  BP: 109/53 (11-07-23 @ 00:30) (104/58 - 145/63)  BP(mean): 76 (11-07-23 @ 00:30) (76 - 89)  RR: 17 (11-07-23 @ 00:30) (12 - 17)  SpO2: 98% (11-07-23 @ 00:30) (95% - 98%)  Wt(kg): --  Daily Height in cm: 170.18 (06 Nov 2023 13:15)    Daily   I&O's Summary    06 Nov 2023 07:01  -  07 Nov 2023 01:34  --------------------------------------------------------  IN: 50 mL / OUT: 650 mL / NET: -600 mL          Physical Exam:  Appearance: Normal  Eyes: PERRL, EOMI  HENT: Normal oral mus[place], NC/AT  Cardiovascular: S1S2, RRR, No M/R/G, no JVD, No Lower extremity edema  Procedural Access Site: No hematoma, Non-tender to palpation, 2+ pulse, No bruit, No Ecchymosis  Respiratory: Clear to auscultation bilaterally  Gastrointestinal: Soft, Non tender, Normal Bowel Sounds  Musculoskeletal: No clubbing, No joint deformity   Neurologic: Non-focal  Lymphatic: No lymphadenopathy  Psychiatry: AAOx3, Mood & affect appropriate  Skin: No rashes, No ecchymoses, No cyanosis    11-06    136  |  101  |  22  ----------------------------<  104<H>  4.7   |  22  |  1.01    Ca    9.6      06 Nov 2023 12:10              Lipid panel   Hgb A1c                         13.5   10.33 )-----------( 153      ( 07 Nov 2023 00:50 )             42.4         ECG: SR 69 bpm

## 2023-11-07 NOTE — DISCHARGE NOTE PROVIDER - PROVIDER TOKENS
PROVIDER:[TOKEN:[[id number]]] PROVIDER:[TOKEN:[[id number]]] PROVIDER:[TOKEN:[[id number]]] PROVIDER:[TOKEN:[[id number]]],PROVIDER:[TOKEN:[[id number]]] PROVIDER:[TOKEN:[[id number]]],PROVIDER:[TOKEN:[[id number]]] PROVIDER:[TOKEN:[[id number]]],PROVIDER:[TOKEN:[[id number]]]

## 2023-11-07 NOTE — DISCHARGE NOTE PROVIDER - NSDCCPCAREPLAN_GEN_ALL_CORE_FT
PRINCIPAL DISCHARGE DIAGNOSIS  Diagnosis: Atrial fibrillation  Assessment and Plan of Treatment: Continue with your cardiologist and primary care MD. Continue your current medications. Call your physician for palpitations, feelings of rapid heart beat, lightheadedness, or dizziness. Continue Eliquis as prescribed.      SECONDARY DISCHARGE DIAGNOSES  Diagnosis: HTN (hypertension)  Assessment and Plan of Treatment: Continue with your blood pressure medications; eat a heart healthy diet with low salt diet; exercise regularly (consult with your physician or cardiologist first); maintain a heart healthy weight; if you smoke - quit (A resource to help you stop smoking is the [facility] [employer] for Tobacco Control – phone number [phone].); include healthy ways to manage stress. Continue to follow with your primary care physician or cardiologist.    Diagnosis: HLD (hyperlipidemia)  Assessment and Plan of Treatment: Continue with your cholesterol medications. Eat a heart healthy diet that is low in saturated fats and salt, and includes whole grains, fruits, vegetables and lean protein; exercise regularly (consult with your physician or cardiologist first); maintain a heart healthy weight; if you smoke - quit (A resource to help you stop smoking is the [facility] [employer] for Tobacco Control – phone number [phone].). Continue to follow with your primary physician or cardiologist.     PRINCIPAL DISCHARGE DIAGNOSIS  Diagnosis: Atrial fibrillation  Assessment and Plan of Treatment: Continue with your cardiologist and primary care MD. Continue your current medications. Call your physician for palpitations, feelings of rapid heart beat, lightheadedness, or dizziness. Continue Eliquis as prescribed.      SECONDARY DISCHARGE DIAGNOSES  Diagnosis: HTN (hypertension)  Assessment and Plan of Treatment: Continue with your blood pressure medications; eat a heart healthy diet with low salt diet; exercise regularly (consult with your physician or cardiologist first); maintain a heart healthy weight; if you smoke - quit (A resource to help you stop smoking is the [facility] [employer] for Tobacco Control – phone number [phone].); include healthy ways to manage stress. Continue to follow with your primary care physician or cardiologist.    Diagnosis: HLD (hyperlipidemia)  Assessment and Plan of Treatment: Continue with your cholesterol medications. Eat a heart healthy diet that is low in saturated fats and salt, and includes whole grains, fruits, vegetables and lean protein; exercise regularly (consult with your physician or cardiologist first); maintain a heart healthy weight; if you smoke - quit (A resource to help you stop smoking is the [facility] [employer] for Tobacco Control – phone number [phone].). Continue to follow with your primary physician or cardiologist.     PRINCIPAL DISCHARGE DIAGNOSIS  Diagnosis: Atrial fibrillation  Assessment and Plan of Treatment: Continue with your cardiologist and primary care MD. Continue your current medications. Call your physician for palpitations, feelings of rapid heart beat, lightheadedness, or dizziness. Continue Eliquis as prescribed.      SECONDARY DISCHARGE DIAGNOSES  Diagnosis: HTN (hypertension)  Assessment and Plan of Treatment: Continue with your blood pressure medications; eat a heart healthy diet with low salt diet; exercise regularly (consult with your physician or cardiologist first); maintain a heart healthy weight; if you smoke - quit (A resource to help you stop smoking is the [facility] [employer] for Tobacco Control – phone number [phone].); include healthy ways to manage stress. Continue to follow with your primary care physician or cardiologist.    Diagnosis: HLD (hyperlipidemia)  Assessment and Plan of Treatment: Continue with your cholesterol medications. Eat a heart healthy diet that is low in saturated fats and salt, and includes whole grains, fruits, vegetables and lean protein; exercise regularly (consult with your physician or cardiologist first); maintain a heart healthy weight; if you smoke - quit (A resource to help you stop smoking is the [facility] [employer] for Tobacco Control – phone number [phone].). Continue to follow with your primary physician or cardiologist.

## 2023-11-07 NOTE — DISCHARGE NOTE PROVIDER - NSDCMRMEDTOKEN_GEN_ALL_CORE_FT
budesonide-formoterol 160 mcg-4.5 mcg/inh inhalation aerosol: 2 puff(s) inhaled once a day HOSP  budesonide-formoterol 160 mcg-4.5 mcg/inh inhalation aerosol: 2 puff(s) inhaled once a day HOSP  clopidogrel 75 mg oral tablet: 1 tab(s) orally once a day HOME/HOSP  Eliquis 5 mg oral tablet: 1 tab(s) orally 2 times a day HOME/HOSP  enalapril 5 mg oral tablet: 1 tab(s) orally once a day HOSP  enalapril 5 mg oral tablet: 1 tab(s) orally once a day  famotidine 20 mg oral tablet: 1 tab(s) orally 2 times a day HOSP  finasteride 5 mg oral tablet: 1 tab(s) orally once a day HOME/HOSP  folic acid 1 mg oral tablet: 1 tab(s) orally once a day  levalbuterol 0.63 mg/3 mL inhalation solution: 3 milliliter(s) by nebulizer every 6 hours as needed for  shortness of breath and/or wheezing HOSP  Melatonin 5 mg oral tablet: 1 tab(s) orally once a day (at bedtime) as needed for  insomnia HOSP  Melatonin 5 mg oral tablet: 1 tab(s) orally once a day (at bedtime) as needed for  insomnia HOSP  metoprolol tartrate 25 mg oral tablet: 1 tab(s) orally 3 times a day  pantoprazole 40 mg oral delayed release tablet: 1 tab(s) orally once a day (before a meal)  rosuvastatin 20 mg oral tablet: 1 tab(s) orally once a day HOME/HOSP  spironolactone 25 mg oral tablet: 1 tab(s) orally once a day  tamsulosin 0.4 mg oral capsule: 1 cap(s) orally HOME   Eliquis 5 mg oral tablet: 1 tab(s) orally 2 times a day  enalapril 5 mg oral tablet: 1 tab(s) orally once a day  famotidine 20 mg oral tablet: 1 tab(s) orally 2 times a day HOSP  finasteride 5 mg oral tablet: 1 tab(s) orally once a day  folic acid 1 mg oral tablet: 1 tab(s) orally once a day  metoprolol succinate 100 mg oral capsule, extended release: 1 cap(s) orally once a day  pantoprazole 40 mg oral delayed release tablet: 1 tab(s) orally once a day (before a meal)  rosuvastatin 20 mg oral tablet: 1 tab(s) orally once a day  spironolactone 25 mg oral tablet: 1 tab(s) orally once a day  tamsulosin 0.4 mg oral capsule: 1 cap(s) orally

## 2023-11-07 NOTE — DISCHARGE NOTE NURSING/CASE MANAGEMENT/SOCIAL WORK - PATIENT PORTAL LINK FT
You can access the FollowMyHealth Patient Portal offered by [facility] by registering at the following website: [email or web address]. By joining [email or web address]’s FollowMyHealth portal, you will also be able to view your health information using other applications (apps) compatible with our system.

## 2023-11-07 NOTE — PROVIDER CONTACT NOTE (OTHER) - ASSESSMENT
A&OX4, as per pt he felt it very briefly, pt describes it as an "extra beat", VS T 98.5F, /60, HR 68, R 17 Oxygen sat 95% on Oxygen 2LPM via [place].

## 2023-11-07 NOTE — DISCHARGE NOTE PROVIDER - NPI NUMBER (FOR SYS[employer]MIN USE ONLY) :
[[id number]] [[id number]] [[id number]] [[id number]],[[id number]] [[id number]],[[id number]] [[id number]],[[id number]]

## 2023-11-07 NOTE — DISCHARGE NOTE PROVIDER - HOSPITAL COURSE
Faxed signed CC note to [facility] FAX# listed below.    HPI:  82 y/o  male PMH CAD s/p 3V CABG (Nov 2012), Mod AR and Mod AS with EF 64% on March 2023 echo, HTN, HLD, Carotid stenosis s/p left CEA (2000), former smoker, COPD on home O2, small hemorrhagic renal mass, 3.7cm AAA f/b Dr. 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 Pt with mild PVC and mod right sided pleural effusion, lower lobe interstitial infiltrates (R>L) on inital CXR, treated with IV duresis. POOL likely 2/2 duresis, resolved. BCx2 no growth. LE duplex negative for DVT. Hypotension during course of hospitalization treated with Midodrine; Tamsulosin held. Repeat CXR showed normal pulm vasculature, mild B/L lower lobe atelectasis and pleural effusion improved. ECHO done 10/25 showed mild to mod reduced LV systolic function, RV systolic function reduced, RV systolic pressure suggested mild pulm HTN, mild to mod MR, mild MS, mild AR, mild AS, mild TR, no pericardial effusion, EF 37%.  S/P EVELIN and  DCCV at 120J (pt went into AT after 1 min in NSR) and DCCV at 150 J (went into AT after 30 secs in NSR and converted with Metoprolol 2.5mg IVx1) at [place] on 10/30/23. He had Tikosyn ordered to help maintain NSR but had prolonged QT and tachycardia.  Repeat ECHO done 11/2 showed normal LV size, EF 40-45%. Initial plan was for AVJ ablation and PPM but with repeat ECHO revealing normal size LA it was decided that pt would most benefit AF ablation. Transferred to [facility] for Afib ablation.     Pulm: Dr. Quach [facility] Pulmonology (06 Nov 2023 11:33)      11/6 s/p atrial fib ablation. Right femoral vein access site without swelling, bleeding.   HPI:  82 y/o  male PMH CAD s/p 3V CABG (Nov 2012), Mod AR and Mod AS with EF 64% on March 2023 echo, HTN, HLD, Carotid stenosis s/p left CEA (2000), former smoker, COPD on home O2, small hemorrhagic renal mass, 3.7cm AAA f/b Dr. 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 Pt with mild PVC and mod right sided pleural effusion, lower lobe interstitial infiltrates (R>L) on inital CXR, treated with IV duresis. POOL likely 2/2 duresis, resolved. BCx2 no growth. LE duplex negative for DVT. Hypotension during course of hospitalization treated with Midodrine; Tamsulosin held. Repeat CXR showed normal pulm vasculature, mild B/L lower lobe atelectasis and pleural effusion improved. ECHO done 10/25 showed mild to mod reduced LV systolic function, RV systolic function reduced, RV systolic pressure suggested mild pulm HTN, mild to mod MR, mild MS, mild AR, mild AS, mild TR, no pericardial effusion, EF 37%.  S/P EVELIN and  DCCV at 120J (pt went into AT after 1 min in NSR) and DCCV at 150 J (went into AT after 30 secs in NSR and converted with Metoprolol 2.5mg IVx1) at [place] on 10/30/23. He had Tikosyn ordered to help maintain NSR but had prolonged QT and tachycardia.  Repeat ECHO done 11/2 showed normal LV size, EF 40-45%. Initial plan was for AVJ ablation and PPM but with repeat ECHO revealing normal size LA it was decided that pt would most benefit AF ablation. Transferred to [facility] for Afib ablation.     Pulm: Dr. Quach [facility] Pulmonology (06 Nov 2023 11:33)      11/6 s/p atrial fib ablation. Right femoral vein access site without swelling, bleeding.   HPI:  84 y/o  male PMH CAD s/p 3V CABG (Nov 2012), Mod AR and Mod AS with EF 64% on March 2023 echo, HTN, HLD, Carotid stenosis s/p left CEA (2000), former smoker, COPD on home O2, small hemorrhagic renal mass, 3.7cm AAA f/b Dr. 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 Pt with mild PVC and mod right sided pleural effusion, lower lobe interstitial infiltrates (R>L) on inital CXR, treated with IV duresis. POLO likely 2/2 duresis, resolved. BCx2 no growth. LE duplex negative for DVT. Hypotension during course of hospitalization treated with Midodrine; Tamsulosin held. Repeat CXR showed normal pulm vasculature, mild B/L lower lobe atelectasis and pleural effusion improved. ECHO done 10/25 showed mild to mod reduced LV systolic function, RV systolic function reduced, RV systolic pressure suggested mild pulm HTN, mild to mod MR, mild MS, mild AR, mild AS, mild TR, no pericardial effusion, EF 37%.  S/P EVELIN and  DCCV at 120J (pt went into AT after 1 min in NSR) and DCCV at 150 J (went into AT after 30 secs in NSR and converted with Metoprolol 2.5mg IVx1) at [place] on 10/30/23. He had Tikosyn ordered to help maintain NSR but had prolonged QT and tachycardia.  Repeat ECHO done 11/2 showed normal LV size, EF 40-45%. Initial plan was for AVJ ablation and PPM but with repeat ECHO revealing normal size LA it was decided that pt would most benefit AF ablation. Transferred to [facility] for Afib ablation.     Pulm: Dr. Quach [facility] Pulmonology (06 Nov 2023 11:33)      11/6 s/p atrial fib ablation. Right femoral vein access site without swelling, bleeding.   HPI:  84 y/o  male PMH CAD s/p 3V CABG (Nov 2012), Mod AR and Mod AS with EF 64% on March 2023 echo, HTN, HLD, Carotid stenosis s/p left CEA (2000), former smoker, COPD on home O2, small hemorrhagic renal mass, 3.7cm AAA f/b Dr. 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 Pt with mild PVC and mod right sided pleural effusion, lower lobe interstitial infiltrates (R>L) on inital CXR, treated with IV duresis. POOL likely 2/2 duresis, resolved. BCx2 no growth. LE duplex negative for DVT. Hypotension during course of hospitalization treated with Midodrine; Tamsulosin held. Repeat CXR showed normal pulm vasculature, mild B/L lower lobe atelectasis and pleural effusion improved. ECHO done 10/25 showed mild to mod reduced LV systolic function, RV systolic function reduced, RV systolic pressure suggested mild pulm HTN, mild to mod MR, mild MS, mild AR, mild AS, mild TR, no pericardial effusion, EF 37%.  S/P EVELIN and  DCCV at 120J (pt went into AT after 1 min in NSR) and DCCV at 150 J (went into AT after 30 secs in NSR and converted with Metoprolol 2.5mg IVx1) at [place] on 10/30/23. He had Tikosyn ordered to help maintain NSR but had prolonged QT and tachycardia.  Repeat ECHO done 11/2 showed normal LV size, EF 40-45%. Initial plan was for AVJ ablation and PPM but with repeat ECHO revealing normal size LA it was decided that pt would most benefit AF ablation. Transferred to [facility] for Afib ablation.     Pulm: Dr. Main [facility] (06 Nov 2023 11:33)      11/6 s/p atrial fib ablation. Right femoral vein access site without swelling, bleeding.    Discussed with MD - pt stable for discharge home, pt with no complaints, discharge medications reviewed with MD.   HPI:  82 y/o  male PMH CAD s/p 3V CABG (Nov 2012), Mod AR and Mod AS with EF 64% on March 2023 echo, HTN, HLD, Carotid stenosis s/p left CEA (2000), former smoker, COPD on home O2, small hemorrhagic renal mass, 3.7cm AAA f/b Dr. 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 Pt with mild PVC and mod right sided pleural effusion, lower lobe interstitial infiltrates (R>L) on inital CXR, treated with IV duresis. POOL likely 2/2 duresis, resolved. BCx2 no growth. LE duplex negative for DVT. Hypotension during course of hospitalization treated with Midodrine; Tamsulosin held. Repeat CXR showed normal pulm vasculature, mild B/L lower lobe atelectasis and pleural effusion improved. ECHO done 10/25 showed mild to mod reduced LV systolic function, RV systolic function reduced, RV systolic pressure suggested mild pulm HTN, mild to mod MR, mild MS, mild AR, mild AS, mild TR, no pericardial effusion, EF 37%.  S/P EVELIN and  DCCV at 120J (pt went into AT after 1 min in NSR) and DCCV at 150 J (went into AT after 30 secs in NSR and converted with Metoprolol 2.5mg IVx1) at [place] on 10/30/23. He had Tikosyn ordered to help maintain NSR but had prolonged QT and tachycardia.  Repeat ECHO done 11/2 showed normal LV size, EF 40-45%. Initial plan was for AVJ ablation and PPM but with repeat ECHO revealing normal size LA it was decided that pt would most benefit AF ablation. Transferred to [facility] for Afib ablation.     [facility] Nov 2023 11:33[facility]      11/6 s/p atrial fib ablation. Right femoral vein access site without swelling, bleeding.    Discussed with MD - pt stable for discharge home, pt with no complaints, discharge medications reviewed with MD.   HPI:  82 y/o  male PMH CAD s/p 3V CABG (Nov 2012), Mod AR and Mod AS with EF 64% on March 2023 echo, HTN, HLD, Carotid stenosis s/p left CEA (2000), former smoker, COPD on home O2, small hemorrhagic renal mass, 3.7cm AAA f/b Dr. Amanda, peripheral claudication, rectal bleeding, low grade bladder cancer s/p TURBT, pancreatic uncinate process cyst measuring up to 2.5cm followed by PCP (no change in size recent), with c/o SOB that is worse with exertion for 4-5 days prior to admission, admitted to [facility] on 10/23/23 with acute on chronic HFrEF and Afib with RVR. Pt with mild PVC and mod right sided pleural effusion, lower lobe interstitial infiltrates (R>L) on inital CXR, treated with IV duresis. POOL likely 2/2 duresis, resolved. BCx2 no growth. LE duplex negative for DVT. Hypotension during course of hospitalization treated with Midodrine; Tamsulosin held. Repeat CXR showed normal pulm vasculature, mild B/L lower lobe atelectasis and pleural effusion improved. ECHO done 10/25 showed mild to mod reduced LV systolic function, RV systolic function reduced, RV systolic pressure suggested mild pulm HTN, mild to mod MR, mild MS, mild AR, mild AS, mild TR, no pericardial effusion, EF 37%.  S/P EVELIN and  DCCV at 120J (pt went into AT after 1 min in NSR) and DCCV at 150 J (went into AT after 30 secs in NSR and converted with Metoprolol 2.5mg IVx1) at [place] on 10/30/23. He had Tikosyn ordered to help maintain NSR but had prolonged QT and tachycardia.  Repeat ECHO done 11/2 showed normal LV size, EF 40-45%. Initial plan was for AVJ ablation and PPM but with repeat ECHO revealing normal size LA it was decided that pt would most benefit AF ablation. Transferred to [facility] for Afib ablation.     [facility] (06 Nov 2023 11:33)      11/6 s/p atrial fib ablation. Right femoral vein access site without swelling, bleeding.    Discussed with MD - pt stable for discharge home, pt with no complaints, discharge medications reviewed with MD.

## 2023-11-07 NOTE — DISCHARGE NOTE PROVIDER - CARE PROVIDER_API CALL
Ramirez Ortiz  Cardiac Electrophysiology  [address] [place] [address], [address] [place] [address]  Phone: [phone]  Fax: [phone]  Follow Up Time:    Ramirez Ortiz  Cardiac Electrophysiology  [address] [place] [address], [address] [place] [address]  Phone: [phone]  Fax: [phone]  Follow Up Time:    Ramirez Ortiz  Cardiac Electrophysiology  [address] [place] [address], [address] [place] [id number]  Phone: [phone]  Fax: [phone]  Follow Up Time:    Ramirez Ortiz  Cardiac Electrophysiology  [address], [address] [place] [address]  Phone: [phone]  Fax: [phone]  Follow Up Time:     Messi Masterson  Cardiovascular Disease  [address] [facility] [address], [address]  [place] [address]  Phone: [phone]  Fax: [phone]  Follow Up Time:    Ramirez Ortiz  Cardiac Electrophysiology  [address], [address] [place] [address]  Phone: [phone]  Fax: [phone]  Follow Up Time:     Messi Masterson  Cardiovascular Disease  [address] [facility] [address], [address]  [place] [address]  Phone: [phone]  Fax: [phone]  Follow Up Time:    Ramirez Ortiz  Cardiac Electrophysiology  [address], [address] [place] [address]  Phone: [phone]  Fax: [phone]  Follow Up Time:     Messi Masterson  Cardiovascular Disease  [address] [facility] [address], [address]  [place] [address]  Phone: [phone]  Fax: [phone]  Follow Up Time:

## 2023-11-07 NOTE — DISCHARGE NOTE NURSING/CASE MANAGEMENT/SOCIAL WORK - NSDCPEFALRISK_GEN_ALL_CORE
For information on Fall & Injury Prevention, visit: [email or web address] OR  [email or web address] OR  https://www.cdc.gov/steadi/patient.html

## 2023-11-08 ENCOUNTER — NON-APPOINTMENT (OUTPATIENT)
Age: 83
End: 2023-11-08

## 2023-11-13 PROCEDURE — 86900 BLOOD TYPING SEROLOGIC ABO: CPT

## 2023-11-13 PROCEDURE — 93655 ICAR CATH ABLTJ DSCRT ARRHYT: CPT

## 2023-11-13 PROCEDURE — 86901 BLOOD TYPING SEROLOGIC RH(D): CPT

## 2023-11-13 PROCEDURE — 93656 COMPRE EP EVAL ABLTJ ATR FIB: CPT

## 2023-11-13 PROCEDURE — C1766: CPT

## 2023-11-13 PROCEDURE — 86850 RBC ANTIBODY SCREEN: CPT

## 2023-11-13 PROCEDURE — 93657 TX L/R ATRIAL FIB ADDL: CPT

## 2023-11-13 PROCEDURE — C1889: CPT

## 2023-11-13 PROCEDURE — 80048 BASIC METABOLIC PNL TOTAL CA: CPT

## 2023-11-13 PROCEDURE — C1759: CPT

## 2023-11-13 PROCEDURE — C1730: CPT

## 2023-11-13 PROCEDURE — 93005 ELECTROCARDIOGRAM TRACING: CPT

## 2023-11-13 PROCEDURE — C1894: CPT

## 2023-11-13 PROCEDURE — 85027 COMPLETE CBC AUTOMATED: CPT

## 2023-11-13 PROCEDURE — C1732: CPT

## 2023-12-11 ENCOUNTER — APPOINTMENT (OUTPATIENT)
Dept: PULMONOLOGY | Facility: CLINIC | Age: 83
End: 2023-12-11

## 2023-12-28 ENCOUNTER — NON-APPOINTMENT (OUTPATIENT)
Age: 83
End: 2023-12-28

## 2023-12-28 ENCOUNTER — APPOINTMENT (OUTPATIENT)
Dept: ELECTROPHYSIOLOGY | Facility: CLINIC | Age: 83
End: 2023-12-28
Payer: MEDICARE

## 2023-12-28 VITALS
HEIGHT: 67 IN | WEIGHT: 183 LBS | OXYGEN SATURATION: 93 % | BODY MASS INDEX: 28.72 KG/M2 | DIASTOLIC BLOOD PRESSURE: 83 MMHG | SYSTOLIC BLOOD PRESSURE: 132 MMHG | HEART RATE: 87 BPM

## 2023-12-28 DIAGNOSIS — J42 UNSPECIFIED CHRONIC BRONCHITIS: ICD-10-CM

## 2023-12-28 DIAGNOSIS — I48.0 PAROXYSMAL ATRIAL FIBRILLATION: ICD-10-CM

## 2023-12-28 PROCEDURE — 93000 ELECTROCARDIOGRAM COMPLETE: CPT

## 2023-12-28 PROCEDURE — 99213 OFFICE O/P EST LOW 20 MIN: CPT | Mod: 25

## 2023-12-28 RX ORDER — FINASTERIDE 5 MG/1
5 TABLET, FILM COATED ORAL DAILY
Refills: 0 | Status: ACTIVE | COMMUNITY
Start: 2021-01-23

## 2023-12-28 RX ORDER — ENALAPRIL MALEATE 5 MG/1
5 TABLET ORAL DAILY
Refills: 0 | Status: ACTIVE | COMMUNITY

## 2023-12-28 RX ORDER — METOPROLOL SUCCINATE 100 MG/1
100 TABLET, EXTENDED RELEASE ORAL DAILY
Qty: 90 | Refills: 3 | Status: ACTIVE | COMMUNITY

## 2023-12-28 RX ORDER — PROPRANOLOL HYDROCHLORIDE 120 MG/1
120 CAPSULE, EXTENDED RELEASE ORAL
Qty: 90 | Refills: 0 | Status: DISCONTINUED | COMMUNITY
Start: 2020-07-02 | End: 2023-12-28

## 2023-12-28 RX ORDER — CLOPIDOGREL BISULFATE 75 MG/1
75 TABLET, FILM COATED ORAL
Qty: 90 | Refills: 0 | Status: DISCONTINUED | COMMUNITY
Start: 2020-07-02 | End: 2023-12-28

## 2023-12-28 RX ORDER — PANTOPRAZOLE 40 MG/1
40 TABLET, DELAYED RELEASE ORAL DAILY
Refills: 0 | Status: ACTIVE | COMMUNITY

## 2023-12-28 RX ORDER — ASPIRIN 81 MG/1
81 TABLET, COATED ORAL DAILY
Refills: 0 | Status: ACTIVE | COMMUNITY

## 2023-12-28 RX ORDER — SPIRONOLACTONE 25 MG/1
25 TABLET ORAL DAILY
Refills: 0 | Status: ACTIVE | COMMUNITY

## 2023-12-28 RX ORDER — APIXABAN 5 MG/1
5 TABLET, FILM COATED ORAL
Qty: 180 | Refills: 0 | Status: ACTIVE | COMMUNITY

## 2023-12-28 RX ORDER — VERAPAMIL HYDROCHLORIDE 80 MG/1
TABLET ORAL
Refills: 0 | Status: DISCONTINUED | COMMUNITY
End: 2023-12-28

## 2024-01-03 PROBLEM — J42 CHRONIC BRONCHITIS, UNSPECIFIED CHRONIC BRONCHITIS TYPE: Status: ACTIVE | Noted: 2021-06-23

## 2024-01-03 PROBLEM — I48.0 PAROXYSMAL ATRIAL FIBRILLATION: Status: ACTIVE | Noted: 2024-01-03

## 2024-01-03 NOTE — DISCUSSION/SUMMARY
[FreeTextEntry1] : EKG shows normal sinus. He feels much better since ablation. I encouraged him to use O2 to decrease risks of developing worsening pulmonary hypertension. [EKG obtained to assist in diagnosis and management of assessed problem(s)] : EKG obtained to assist in diagnosis and management of assessed problem(s)

## 2024-01-03 NOTE — HISTORY OF PRESENT ILLNESS
Patient scheduled to be seen in walkin clinic.   
[FreeTextEntry1] : Has been feeling much better since ablation and remains in sinus, Has chronic O2 dependent COPD. ECG shows sinus.

## 2024-03-19 ENCOUNTER — APPOINTMENT (OUTPATIENT)
Dept: PULMONOLOGY | Facility: CLINIC | Age: 84
End: 2024-03-19
Payer: MEDICARE

## 2024-03-19 VITALS
SYSTOLIC BLOOD PRESSURE: 138 MMHG | WEIGHT: 184 LBS | HEIGHT: 67 IN | BODY MASS INDEX: 28.88 KG/M2 | DIASTOLIC BLOOD PRESSURE: 70 MMHG | TEMPERATURE: 97.2 F | HEART RATE: 72 BPM | OXYGEN SATURATION: 94 %

## 2024-03-19 DIAGNOSIS — J43.2 CENTRILOBULAR EMPHYSEMA: ICD-10-CM

## 2024-03-19 DIAGNOSIS — J90 PLEURAL EFFUSION, NOT ELSEWHERE CLASSIFIED: ICD-10-CM

## 2024-03-19 PROCEDURE — 99214 OFFICE O/P EST MOD 30 MIN: CPT

## 2024-03-28 PROBLEM — J43.2 CENTRILOBULAR EMPHYSEMA: Status: ACTIVE | Noted: 2021-06-23

## 2024-03-28 PROBLEM — J90 PLEURAL EFFUSION, RIGHT: Status: ACTIVE | Noted: 2024-03-28

## 2024-03-28 NOTE — REASON FOR VISIT
[Follow-Up] : a follow-up visit [COPD] : COPD [Emphysema] : emphysema [TextEntry] : 6 month. Patient has no pulmonary complaints.

## 2024-03-28 NOTE — REVIEW OF SYSTEMS
[Postnasal Drip] : postnasal drip [Edentulous] : edentulous [Cough] : no cough [Hemoptysis] : no hemoptysis [Chest Tightness] : no chest tightness [Frequent URIs] : no frequent URIs [Sputum] : no sputum [Dyspnea] : dyspnea [SOB on Exertion] : sob on exertion [Claudication] : claudication [Leg Cramps] : leg cramps [Nocturia] : nocturia [Frequency] : dysuria [Arthralgias] : arthralgias [Back Pain] : back pain [Diabetes] : no diabetes [Thyroid Problem] : no thyroid problem [Obesity] : obesity [Negative] : Gastrointestinal [TextBox_3] : generally feeing better after hospital admission    [TextBox_14] : no treating  He had a swallow test no aspiration  [TextBox_91] : hips and knees  [TextBox_30] : dyspnea walking to the mailbox but not from the car to my office  [TextBox_44] : monitored by dr torres vascular  The cardiologist is Dr Masterson  [TextBox_110] : w/u for low platelets now normal  [TextBox_101] : squamous MOHs on his right check

## 2024-03-28 NOTE — ASSESSMENT
[FreeTextEntry1] : 1) emphysema without significant airflow restriction however increasing decrease in DLCO 2) vascular problems aortic and abd no change in the chest on recent ct scan   3) the patient urged to maintain some activity level and don't sit idly for long periods of time   4) f/u in 6mos 5) ct scan in one yr      the patient was seen with his wife for 20minutes counselling, review and viewing his imaging, PE and medication( does't need any at this time   5/4/2022 1) patient has emphysema nonresponsive to inhalers on no medications.  2) the patient has back and vascular problems and is unable to exercise any significant degree.  3) the patient's oxygen nocturnally has close his wife to leave the bedroom to sleep elsewhere because of the noise the concentrator makes.  At this point we will try to confirm whether he does or does not need the nocturnal oxygen by getting a overnight oximetry test.  The patient and his wife were told that he must wear this without oxygen at night to find out what appropriate for his care time spent 30 minutes educating counseling physical exam history and review of imaging follow-up in 3 months  8/10/22 The patient is doing well not having any new sx He has not ye had his overnight oximetry testing   He will need the testing to determine his O2 requirement and qualify for a new perhaps a new machine.   11/29/22The patient will need a Sleep study   He is to have another colonoscopy in January  to remove a polyp he will need pre op evaluation within a month of the procedure    f/u  in December   The second test on O2 there was much improved he will increase the FIO2 ingrid 3L /min and return in 4months   time spent 30minute   counseling, education, documentation, imaging reviewed, medication reviewed,  old records reviewed, HX and PE   1/16/23 the patient has emphysema  and has a DLCO of 44%  He needs O2 nocturnally . His ventilatory function is only mildly impaired with a FEV1 of 66%    He has a moderate resp risk for hypoxemia   There is no pulmonary contraindication for the proposed colonoscopy under general anesthesia  time spent 30 minutes  counseling, education, documentation, imaging reviewed, medication reviewed,  old records reviewed, HX and PE    6/20/23  the patient is very limited and has desaturation of his SaO2 even when he talks He does not have much activity and has no Sx with the lower O2 sat while exerting himself     He will return in 4mos time spent 30 min  counseling, education, documentation, imaging reviewed, medication reviewed, old records reviewed, HX and PE   9/29/23  The patient has advanced emphysema and valvular heart disease   He is stable now but he is still on the prdenisone taper     3/19/24  The [facility] and had a lower Ej fx than he had in march he was also in A fib and had an ablation and RX for CHF with diuretics. He has not been on resp inhalers and has primarily emphysema as opposed to Chronic Bronchitis     He has no obvious Sx of  a reoccurrence of his pleural effusion  He will f/u with Cardiology. He should continue the O2  Hwas told to call if increased Resp sx and a F/U in 6months  time spent 30 min  counseling, education, documentation, imaging reviewed, medication reviewed,, old records reviewed, HX and PE

## 2024-03-28 NOTE — HISTORY OF PRESENT ILLNESS
[Former] : former [Never] : never [Continuous] : Continuous [NC] : Nasal Cannula [Nightly] : Nightly [TextBox_13] : 50 [TextBox_4] :     9/29/23 The patient was in the hospital at [facility] and was released a few days ago. The patient was rxed for new onset A fib, He was treated for CHF and an exacerbation of COPD. Echo showed aortic valve stenosis and regurg and mitral regurg He has a aortic aneurysm that is monitored His Ct of the chest didn not show any embolus and he has severe upper lobe centrilobular emphysema.   Smoking Status: former   # Packs per day: 1.5   # Years: 50   Year quit: 2013   3/19/24   3/19/24   Benito Cuellar is a 84-year-old male with history of patient has COPD,on home O2  and aortic stenosis.  Patient has had hospital admissions for CHF most notably ablation for A fib  The patient has no acute Sx and he had a Ct in 2022   He was admitted to [facility] in 11/2023  for the ablation  he had an EFX 3/2023of 64% but has HFrEF   s/p 3 vessel CABG  and had been admitted to [facility] for CHF in 10/23 . He had a R plerual effusion and interstitial edema. 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

## 2024-03-28 NOTE — PHYSICAL EXAM
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

## 2024-09-27 ENCOUNTER — APPOINTMENT (OUTPATIENT)
Dept: PULMONOLOGY | Facility: CLINIC | Age: 84
End: 2024-09-27
Payer: MEDICARE

## 2024-09-27 VITALS
TEMPERATURE: 97.3 F | OXYGEN SATURATION: 92 % | SYSTOLIC BLOOD PRESSURE: 126 MMHG | HEART RATE: 74 BPM | HEIGHT: 67 IN | DIASTOLIC BLOOD PRESSURE: 58 MMHG | WEIGHT: 189 LBS | BODY MASS INDEX: 29.66 KG/M2

## 2024-09-27 DIAGNOSIS — J43.2 CENTRILOBULAR EMPHYSEMA: ICD-10-CM

## 2024-09-27 DIAGNOSIS — I48.0 PAROXYSMAL ATRIAL FIBRILLATION: ICD-10-CM

## 2024-09-27 PROCEDURE — 99215 OFFICE O/P EST HI 40 MIN: CPT

## 2024-09-27 RX ORDER — METOPROLOL SUCCINATE 50 MG/1
50 TABLET, EXTENDED RELEASE ORAL
Refills: 0 | Status: ACTIVE | COMMUNITY

## 2024-09-27 RX ORDER — DILTIAZEM HYDROCHLORIDE 60 MG/1
60 TABLET ORAL
Refills: 0 | Status: ACTIVE | COMMUNITY

## 2024-09-27 RX ORDER — ASPIRIN 81 MG
81 TABLET, DELAYED RELEASE (ENTERIC COATED) ORAL
Refills: 0 | Status: ACTIVE | COMMUNITY

## 2024-09-29 NOTE — ASSESSMENT
[FreeTextEntry1] :   5/4/2022 1) patient has emphysema nonresponsive to inhalers on no medications.  2) the patient has back and vascular problems and is unable to exercise any significant degree.  3) the patient's oxygen nocturnally has close his wife to leave the bedroom to sleep elsewhere because of the noise the concentrator makes.  At this point we will try to confirm whether he does or does not need the nocturnal oxygen by getting a overnight oximetry test.  The patient and his wife were told that he must wear this without oxygen at night to find out what appropriate for his care time spent 30 minutes educating counseling physical exam history and review of imaging follow-up in 3 months  8/10/22 The patient is doing well not having any new sx He has not ye had his overnight oximetry testing   He will need the testing to determine his O2 requirement and qualify for a new perhaps a new machine.   11/29/22The patient will need a Sleep study   He is to have another colonoscopy in January  to remove a polyp he will need pre op evaluation within a month of the procedure    f/u  in December   The second test on O2 there was much improved he will increase the FIO2 ingrid 3L /min and return in 4months   time spent 30minute   counseling, education, documentation, imaging reviewed, medication reviewed,  old records reviewed, HX and PE   1/16/23 the patient has emphysema  and has a DLCO of 44%  He needs O2 nocturnally . His ventilatory function is only mildly impaired with a FEV1 of 66%    He has a moderate resp risk for hypoxemia   There is no pulmonary contraindication for the proposed colonoscopy under general anesthesia  time spent 30 minutes  counseling, education, documentation, imaging reviewed, medication reviewed,  old records reviewed, HX and PE      6/20/23  the patient is very limited and has desaturation of his SaO2 even when he talks He does not have much activity and has no Sx with the lower O2 sat while exerting himself     He will return in 4mos time spent 30 min  counseling, education, documentation, imaging reviewed, medication reviewed, old records reviewed, HX and PE   9/29/23  The patient has advanced emphysema and valvular heart disease   He is stable now but he is still on the prdenisone taper     3/19/24  The [facility] and had a lower Ej fx than he had in march he was also in A fib and had an ablation and RX for CHF with diuretics. He has not been on resp inhalers and has primarily emphysema as opposed to Chronic Bronchitis     He has no obvious Sx of  a reoccurrence of his pleural effusion  He will f/u with Cardiology. He should continue the O2  Hwas told to call if increased Resp sx and a F/U in 6months  time spent 30 min  counseling, education, documentation, imaging reviewed, medication reviewed,, old records reviewed, HX and PE  9/27/2024 Benito Cuellar   has moderate emphysema O2 dependent.  Patient has been stable since being hospitalized in 3/2024 at [facility].  Today's SaO2 was 92% on room air.  The patient does not have a strong obstructive component associated with his emphysema.  The patient will need to have follow-up pulmonary function test and visit in 6 months time spent 45 minutes counseling, education, documentation, imaging reviewed, medication reviewed, old records reviewed, HX and PE

## 2024-09-29 NOTE — PHYSICAL EXAM
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

## 2024-09-29 NOTE — REASON FOR VISIT
[Follow-Up] : a follow-up visit [COPD] : COPD [Emphysema] : emphysema [Shortness of Breath] : shortness of breath [Spouse] : spouse [TextEntry] : 6 months. Patient has SOB with very minimal activity. [email or web address]

## 2024-09-29 NOTE — ASSESSMENT
[FreeTextEntry1] :   5/4/2022 1) patient has emphysema nonresponsive to inhalers on no medications.  2) the patient has back and vascular problems and is unable to exercise any significant degree.  3) the patient's oxygen nocturnally has close his wife to leave the bedroom to sleep elsewhere because of the noise the concentrator makes.  At this point we will try to confirm whether he does or does not need the nocturnal oxygen by getting a overnight oximetry test.  The patient and his wife were told that he must wear this without oxygen at night to find out what appropriate for his care time spent 30 minutes educating counseling physical exam history and review of imaging follow-up in 3 months  8/10/22 The patient is doing well not having any new sx He has not ye had his overnight oximetry testing   He will need the testing to determine his O2 requirement and qualify for a new perhaps a new machine.   11/29/22The patient will need a Sleep study   He is to have another colonoscopy in January  to remove a polyp he will need pre op evaluation within a month of the procedure    f/u  in December   The second test on O2 there was much improved he will increase the FIO2 ingrid 3L /min and return in 4months   time spent 30minute   counseling, education, documentation, imaging reviewed, medication reviewed,  old records reviewed, HX and PE   1/16/23 the patient has emphysema  and has a DLCO of 44%  He needs O2 nocturnally . His ventilatory function is only mildly impaired with a FEV1 of 66%    He has a moderate resp risk for hypoxemia   There is no pulmonary contraindication for the proposed colonoscopy under general anesthesia  time spent 30 minutes  counseling, education, documentation, imaging reviewed, medication reviewed,  old records reviewed, HX and PE      6/20/23  the patient is very limited and has desaturation of his SaO2 even when he talks He does not have much activity and has no Sx with the lower O2 sat while exerting himself     He will return in 4mos time spent 30 min  counseling, education, documentation, imaging reviewed, medication reviewed, old records reviewed, HX and PE   9/29/23  The patient has advanced emphysema and valvular heart disease   He is stable now but he is still on the prdenisone taper     3/19/24  The [facility] and had a lower Ej fx than he had in march he was also in A fib and had an ablation and RX for CHF with diuretics. He has not been on resp inhalers and has primarily emphysema as opposed to Chronic Bronchitis     He has no obvious Sx of  a reoccurrence of his pleural effusion  He will f/u with Cardiology. He should continue the O2  Hwas told to call if increased Resp sx and a F/U in 6months  time spent 30 min  counseling, education, documentation, imaging reviewed, medication reviewed,, old records reviewed, HX and PE  9/27/2024 Benito Cuellar   has moderate emphysema O2 dependent.  Patient has been stable since being hospitalized in 3/2024 at [facility].  Today's SaO2 was 92% on room air.  The patient does not have a strong obstructive component associated with his emphysema.  The patient will need to have follow-up pulmonary function test and visit in 6 months time spent 45 minutes counseling, education, documentation, imaging reviewed, medication reviewed, old records reviewed, HX and PE

## 2024-09-29 NOTE — HISTORY OF PRESENT ILLNESS
[Former] : former [Never] : never [NC] : Nasal Cannula [Intermittent] : Intermittent [Nightly] : Nightly [TextBox_4] : 9/27/24 The patient is doing well and had not had any recetn cardio pulmonary events He had Dr Masterson adjust his cardiac  meds for his heart rate and BP  He has no new dyspnea   The patient has known moderate temphysema with a spDLCO in 2022 of 56%.Walk test in 2022 revealed that his kofi SaO2 was 88%  Patient is on nocturnal oxygen.  He was last hospitalized with a respiratory event in October 2023.  Patient paces his activities during the daytime.  Patient is accompanied by his wife. [TextBox_11] : 1.5 [TextBox_13] : 50 [FreeTextEntry1] : 3 [YearQuit] : 2013

## 2024-09-29 NOTE — HISTORY OF PRESENT ILLNESS
[Former] : former [Never] : never [NC] : Nasal Cannula [Intermittent] : Intermittent [Nightly] : Nightly [TextBox_4] : 9/27/24 The patient is doing well and had not had any recetn cardio pulmonary events He had Dr Masterson adjust his cardiac  meds for his heart rate and BP  He has no new dyspnea   The patient has known moderate temphysema with a spDLCO in 2022 of 56%.Walk test in 2022 revealed that his kofi SaO2 was 88%  Patient is on nocturnal oxygen.  He was last hospitalized with a respiratory event in October 2023.  Patient paces his activities during the daytime.  Patient is accompanied by his wife. [TextBox_11] : 1.5 [TextBox_13] : 50 [YearQuit] : 2013 [FreeTextEntry1] : 3

## 2024-11-06 ENCOUNTER — OFFICE (OUTPATIENT)
Facility: LOCATION | Age: 84
Setting detail: OPHTHALMOLOGY
End: 2024-11-06
Payer: MEDICARE

## 2024-11-06 DIAGNOSIS — H25.13: ICD-10-CM

## 2024-11-06 DIAGNOSIS — H02.132: ICD-10-CM

## 2024-11-06 PROCEDURE — 99203 OFFICE O/P NEW LOW 30 MIN: CPT | Performed by: OPHTHALMOLOGY

## 2024-11-06 ASSESSMENT — CONFRONTATIONAL VISUAL FIELD TEST (CVF)
OD_FINDINGS: FULL
OS_FINDINGS: FULL

## 2024-11-06 ASSESSMENT — LID POSITION - ECTROPION: OD_ECTROPION: RLL

## 2024-11-06 ASSESSMENT — TONOMETRY
OS_IOP_MMHG: 16
OD_IOP_MMHG: 16

## 2024-11-07 ASSESSMENT — VISUAL ACUITY
OS_BCVA: 20/100
OD_BCVA: 20/60

## 2024-11-20 ENCOUNTER — OFFICE (OUTPATIENT)
Facility: LOCATION | Age: 84
Setting detail: OPHTHALMOLOGY
End: 2024-11-20
Payer: MEDICARE

## 2024-11-20 ENCOUNTER — RX ONLY (RX ONLY)
Age: 84
End: 2024-11-20

## 2024-11-20 DIAGNOSIS — H02.132: ICD-10-CM

## 2024-11-20 PROBLEM — H25.13 CATARACT SENILE NUCLEAR SCLEROSIS; BOTH EYES: Status: ACTIVE | Noted: 2024-11-06

## 2024-11-20 PROCEDURE — 99213 OFFICE O/P EST LOW 20 MIN: CPT | Performed by: OPHTHALMOLOGY

## 2024-11-20 ASSESSMENT — TONOMETRY: OD_IOP_MMHG: 16

## 2024-11-20 ASSESSMENT — LID POSITION - ECTROPION: OD_ECTROPION: RLL

## 2024-11-25 ASSESSMENT — VISUAL ACUITY
OS_BCVA: CF 5FT
OD_BCVA: 20/60-1

## 2024-12-04 ENCOUNTER — OFFICE (OUTPATIENT)
Facility: LOCATION | Age: 84
Setting detail: OPHTHALMOLOGY
End: 2024-12-04
Payer: MEDICARE

## 2024-12-04 DIAGNOSIS — H02.132: ICD-10-CM

## 2024-12-04 PROBLEM — H43.393 VITREOUS FLOATERS; BOTH EYES: Status: ACTIVE | Noted: 2024-12-04

## 2024-12-04 PROBLEM — H35.40 PERIPAPILLARY ATROPHY ; BOTH EYES: Status: ACTIVE | Noted: 2024-12-04

## 2024-12-04 PROCEDURE — 99213 OFFICE O/P EST LOW 20 MIN: CPT | Performed by: OPHTHALMOLOGY

## 2024-12-04 ASSESSMENT — LID POSITION - ECTROPION: OD_ECTROPION: RLL

## 2024-12-04 ASSESSMENT — CONFRONTATIONAL VISUAL FIELD TEST (CVF)
OD_FINDINGS: FULL
OS_FINDINGS: FULL

## 2024-12-04 ASSESSMENT — TONOMETRY
OD_IOP_MMHG: 14
OS_IOP_MMHG: 14

## 2024-12-06 ASSESSMENT — REFRACTION_CURRENTRX
OD_OVR_VA: 20/
OS_OVR_VA: 20/
OS_CYLINDER: +1.77
OD_SPHERE: -3.25
OS_AXIS: 53
OD_AXIS: 170
OD_CYLINDER: +2.00
OS_SPHERE: -3.25

## 2024-12-06 ASSESSMENT — VISUAL ACUITY
OD_BCVA: 20/50+2
OS_BCVA: 20/200

## 2025-01-15 ENCOUNTER — OFFICE (OUTPATIENT)
Facility: LOCATION | Age: 85
Setting detail: OPHTHALMOLOGY
End: 2025-01-15
Payer: MEDICARE

## 2025-01-15 DIAGNOSIS — H02.132: ICD-10-CM

## 2025-01-15 DIAGNOSIS — H25.13: ICD-10-CM

## 2025-01-15 DIAGNOSIS — H18.513: ICD-10-CM

## 2025-01-15 PROCEDURE — 99213 OFFICE O/P EST LOW 20 MIN: CPT | Performed by: OPHTHALMOLOGY

## 2025-01-15 ASSESSMENT — CONFRONTATIONAL VISUAL FIELD TEST (CVF)
OS_FINDINGS: FULL
OD_FINDINGS: FULL

## 2025-01-15 ASSESSMENT — CORNEAL DYSTROPHY - POSTERIOR
OS_POSTERIORDYSTROPHY: 1+ 2+
OD_POSTERIORDYSTROPHY: 1+ 2+

## 2025-01-15 ASSESSMENT — LID POSITION - ECTROPION: OD_ECTROPION: RLL

## 2025-01-16 ASSESSMENT — REFRACTION_CURRENTRX
OD_CYLINDER: +2.25
OD_OVR_VA: 20/
OD_AXIS: 105
OD_SPHERE: -3.50
OS_SPHERE: -3.25
OS_OVR_VA: 20/
OS_CYLINDER: +1.75
OS_AXIS: 45

## 2025-01-16 ASSESSMENT — KERATOMETRY
OD_K2POWER_DIOPTERS: 42.75
OD_AXISANGLE_DEGREES: 159
OS_K1POWER_DIOPTERS: 41.50
OD_K1POWER_DIOPTERS: 42.25
OS_K2POWER_DIOPTERS: 42.25
OS_AXISANGLE_DEGREES: 95

## 2025-01-16 ASSESSMENT — VISUAL ACUITY
OS_BCVA: 20/200
OD_BCVA: 20/40

## 2025-01-16 ASSESSMENT — REFRACTION_AUTOREFRACTION
OD_CYLINDER: +4.00
OD_SPHERE: -6.50
OD_AXIS: 157
OS_SPHERE: U/A

## 2025-03-25 ENCOUNTER — APPOINTMENT (OUTPATIENT)
Dept: PULMONOLOGY | Facility: CLINIC | Age: 85
End: 2025-03-25
Payer: MEDICARE

## 2025-03-25 ENCOUNTER — NON-APPOINTMENT (OUTPATIENT)
Age: 85
End: 2025-03-25

## 2025-03-25 VITALS
BODY MASS INDEX: 29.82 KG/M2 | TEMPERATURE: 97.3 F | WEIGHT: 190 LBS | DIASTOLIC BLOOD PRESSURE: 70 MMHG | HEIGHT: 67 IN | OXYGEN SATURATION: 94 % | HEART RATE: 83 BPM | SYSTOLIC BLOOD PRESSURE: 118 MMHG

## 2025-03-25 VITALS — HEART RATE: 74 BPM

## 2025-03-25 DIAGNOSIS — J44.9 CHRONIC OBSTRUCTIVE PULMONARY DISEASE, UNSPECIFIED: ICD-10-CM

## 2025-03-25 PROCEDURE — ZZZZZ: CPT

## 2025-03-25 PROCEDURE — 94727 GAS DIL/WSHOT DETER LNG VOL: CPT

## 2025-03-25 PROCEDURE — 99214 OFFICE O/P EST MOD 30 MIN: CPT | Mod: 25

## 2025-03-25 PROCEDURE — 94010 BREATHING CAPACITY TEST: CPT

## 2025-03-25 PROCEDURE — 94729 DIFFUSING CAPACITY: CPT

## 2025-03-25 RX ORDER — ALBUTEROL SULFATE 2.5 MG/3ML
(2.5 MG/3ML) SOLUTION RESPIRATORY (INHALATION)
Qty: 0 | Refills: 0 | Status: COMPLETED | OUTPATIENT
Start: 2025-03-25

## 2025-03-25 RX ADMIN — Medication 0 0.083%: at 00:00

## 2025-03-31 ENCOUNTER — OFFICE (OUTPATIENT)
Facility: LOCATION | Age: 85
Setting detail: OPHTHALMOLOGY
End: 2025-03-31
Payer: MEDICARE

## 2025-03-31 DIAGNOSIS — H35.371: ICD-10-CM

## 2025-03-31 DIAGNOSIS — H25.11: ICD-10-CM

## 2025-03-31 DIAGNOSIS — H52.13: ICD-10-CM

## 2025-03-31 PROCEDURE — 92015 DETERMINE REFRACTIVE STATE: CPT | Performed by: OPHTHALMOLOGY

## 2025-03-31 PROCEDURE — ECC PRE OP CORNEA CELL COUNT SCREENING: Performed by: OPHTHALMOLOGY

## 2025-03-31 PROCEDURE — ORB PRE OP CORNEA SCREENING: Performed by: OPHTHALMOLOGY

## 2025-03-31 PROCEDURE — 92134 CPTRZ OPH DX IMG PST SGM RTA: CPT | Performed by: OPHTHALMOLOGY

## 2025-03-31 PROCEDURE — 92136 OPHTHALMIC BIOMETRY: CPT | Mod: RT | Performed by: OPHTHALMOLOGY

## 2025-03-31 PROCEDURE — 99214 OFFICE O/P EST MOD 30 MIN: CPT | Performed by: OPHTHALMOLOGY

## 2025-04-01 ASSESSMENT — KERATOMETRY
OD_K2POWER_DIOPTERS: 42.75
OD_K1POWER_DIOPTERS: 42.25
OD_AXISANGLE_DEGREES: 159
OS_AXISANGLE_DEGREES: 95
OS_K1POWER_DIOPTERS: 41.50
OS_K2POWER_DIOPTERS: 42.25

## 2025-04-01 ASSESSMENT — VISUAL ACUITY
OS_BCVA: 20/200
OD_BCVA: 20/25-2

## 2025-04-01 ASSESSMENT — REFRACTION_CURRENTRX
OS_SPHERE: -3.25
OD_CYLINDER: +2.25
OD_OVR_VA: 20/
OD_AXIS: 105
OS_AXIS: 45
OD_SPHERE: -3.50
OS_CYLINDER: +1.75
OS_OVR_VA: 20/

## 2025-04-01 ASSESSMENT — REFRACTION_MANIFEST
OS_SPHERE: -2.25
OD_VA1: 20/100-2
OD_CYLINDER: +3.00
OD_SPHERE: -6.00
OS_AXIS: 45
OS_VA1: 20/25-2
OS_CYLINDER: +1.00
OD_AXIS: 155

## 2025-04-14 ENCOUNTER — ON CAMPUS OUTPATIENT HOSPITAL (OUTPATIENT)
Dept: URBAN - METROPOLITAN AREA CLINIC 48 | Facility: CLINIC | Age: 85
Setting detail: OPHTHALMOLOGY
End: 2025-04-14
Payer: MEDICARE

## 2025-04-14 DIAGNOSIS — H25.11: ICD-10-CM

## 2025-04-14 PROCEDURE — 66984 XCAPSL CTRC RMVL W/O ECP: CPT | Mod: RT | Performed by: OPHTHALMOLOGY

## 2025-04-15 ENCOUNTER — RX ONLY (RX ONLY)
Age: 85
End: 2025-04-15

## 2025-04-15 ENCOUNTER — OFFICE (OUTPATIENT)
Facility: LOCATION | Age: 85
Setting detail: OPHTHALMOLOGY
End: 2025-04-15
Payer: MEDICARE

## 2025-04-15 DIAGNOSIS — Z96.1: ICD-10-CM

## 2025-04-15 PROCEDURE — 99024 POSTOP FOLLOW-UP VISIT: CPT | Performed by: OPTOMETRIST

## 2025-04-15 ASSESSMENT — REFRACTION_AUTOREFRACTION
OS_CYLINDER: +0.50
OS_SPHERE: -2.00
OS_AXIS: 44
OD_SPHERE: UNABLE

## 2025-04-15 ASSESSMENT — VISUAL ACUITY
OS_BCVA: 20/300-1
OD_BCVA: 20/25-2

## 2025-04-15 ASSESSMENT — KERATOMETRY
OS_K2POWER_DIOPTERS: 42.25
OD_K2POWER_DIOPTERS: 42.75
OD_K1POWER_DIOPTERS: 42.25
OS_K1POWER_DIOPTERS: 41.50
OS_AXISANGLE_DEGREES: 95
OD_AXISANGLE_DEGREES: 159

## 2025-04-15 ASSESSMENT — LID POSITION - ECTROPION: OD_ECTROPION: RLL

## 2025-04-15 ASSESSMENT — REFRACTION_CURRENTRX
OD_AXIS: 105
OS_SPHERE: -3.25
OD_SPHERE: -3.50
OS_OVR_VA: 20/
OS_CYLINDER: +1.75
OS_AXIS: 45
OD_CYLINDER: +2.25
OD_OVR_VA: 20/

## 2025-04-15 ASSESSMENT — REFRACTION_MANIFEST
OS_VA1: 20/25-2
OD_AXIS: 155
OS_SPHERE: -2.25
OD_CYLINDER: +3.00
OS_CYLINDER: +1.00
OD_VA1: 20/100-2
OS_AXIS: 45
OD_SPHERE: -6.00

## 2025-04-15 ASSESSMENT — CORNEAL DYSTROPHY - POSTERIOR
OS_POSTERIORDYSTROPHY: 1+ 2+
OD_POSTERIORDYSTROPHY: 1+ 2+

## 2025-04-15 ASSESSMENT — TONOMETRY: OD_IOP_MMHG: 13

## 2025-04-15 ASSESSMENT — CORNEAL EDEMA CLINICAL DESCRIPTION: OD_CORNEALEDEMA: 3+

## 2025-04-18 ENCOUNTER — OFFICE (OUTPATIENT)
Facility: LOCATION | Age: 85
Setting detail: OPHTHALMOLOGY
End: 2025-04-18
Payer: MEDICARE

## 2025-04-18 DIAGNOSIS — Z96.1: ICD-10-CM

## 2025-04-18 PROCEDURE — 99024 POSTOP FOLLOW-UP VISIT: CPT | Performed by: OPHTHALMOLOGY

## 2025-04-18 ASSESSMENT — REFRACTION_CURRENTRX
OD_OVR_VA: 20/
OS_SPHERE: -3.25
OS_AXIS: 45
OD_SPHERE: -3.50
OD_CYLINDER: +2.25
OD_AXIS: 105
OS_CYLINDER: +1.75
OS_OVR_VA: 20/

## 2025-04-18 ASSESSMENT — REFRACTION_AUTOREFRACTION
OS_AXIS: 44
OD_SPHERE: UNABLE
OS_CYLINDER: +0.50
OS_SPHERE: -2.00

## 2025-04-18 ASSESSMENT — REFRACTION_MANIFEST
OS_AXIS: 45
OS_VA1: 20/25-2
OS_CYLINDER: +1.00
OD_VA1: 20/100-2
OD_SPHERE: -6.00
OD_AXIS: 155
OS_SPHERE: -2.25
OD_CYLINDER: +3.00

## 2025-04-18 ASSESSMENT — CORNEAL DYSTROPHY - POSTERIOR
OS_POSTERIORDYSTROPHY: 1+ 2+
OD_POSTERIORDYSTROPHY: 1+ 2+

## 2025-04-18 ASSESSMENT — KERATOMETRY
OS_K2POWER_DIOPTERS: 42.25
OS_AXISANGLE_DEGREES: 95
OD_K1POWER_DIOPTERS: 42.25
OD_AXISANGLE_DEGREES: 159
OS_K1POWER_DIOPTERS: 41.50
OD_K2POWER_DIOPTERS: 42.75

## 2025-04-18 ASSESSMENT — TONOMETRY: OD_IOP_MMHG: 16

## 2025-04-18 ASSESSMENT — LID POSITION - ECTROPION: OD_ECTROPION: RLL

## 2025-04-18 ASSESSMENT — CORNEAL EDEMA CLINICAL DESCRIPTION: OD_CORNEALEDEMA: 1+ 2+

## 2025-04-18 ASSESSMENT — VISUAL ACUITY
OD_BCVA: 20/
OS_BCVA: 20/80-2

## 2025-04-29 ENCOUNTER — APPOINTMENT (OUTPATIENT)
Dept: PULMONOLOGY | Facility: CLINIC | Age: 85
End: 2025-04-29
Payer: MEDICARE

## 2025-04-29 VITALS
SYSTOLIC BLOOD PRESSURE: 104 MMHG | HEART RATE: 68 BPM | WEIGHT: 190 LBS | DIASTOLIC BLOOD PRESSURE: 48 MMHG | BODY MASS INDEX: 29.82 KG/M2 | TEMPERATURE: 97.5 F | OXYGEN SATURATION: 92 % | HEIGHT: 67 IN

## 2025-04-29 DIAGNOSIS — J44.9 CHRONIC OBSTRUCTIVE PULMONARY DISEASE, UNSPECIFIED: ICD-10-CM

## 2025-04-29 PROCEDURE — 99214 OFFICE O/P EST MOD 30 MIN: CPT

## 2025-05-02 ENCOUNTER — OFFICE (OUTPATIENT)
Facility: LOCATION | Age: 85
Setting detail: OPHTHALMOLOGY
End: 2025-05-02
Payer: MEDICARE

## 2025-05-02 DIAGNOSIS — H52.13: ICD-10-CM

## 2025-05-02 DIAGNOSIS — Z96.1: ICD-10-CM

## 2025-05-02 PROBLEM — H25.12 CATARACT NUCLEAR SCLEROSIS AGE RELATED; LEFT EYE: Status: ACTIVE | Noted: 2025-04-15

## 2025-05-02 PROBLEM — H18.513 ENDOTHELIAL CORNEAL DYSTROPHY;  ,, BOTH EYES: Status: ACTIVE | Noted: 2025-05-02

## 2025-05-02 PROCEDURE — 99024 POSTOP FOLLOW-UP VISIT: CPT | Performed by: OPHTHALMOLOGY

## 2025-05-02 PROCEDURE — 92015 DETERMINE REFRACTIVE STATE: CPT | Performed by: OPHTHALMOLOGY

## 2025-05-02 ASSESSMENT — CORNEAL EDEMA CLINICAL DESCRIPTION: OD_CORNEALEDEMA: 1+

## 2025-05-02 ASSESSMENT — CORNEAL DYSTROPHY - POSTERIOR
OD_POSTERIORDYSTROPHY: 1+ 2+
OS_POSTERIORDYSTROPHY: 1+ 2+

## 2025-05-05 ENCOUNTER — NON-APPOINTMENT (OUTPATIENT)
Age: 85
End: 2025-05-05

## 2025-05-05 ASSESSMENT — KERATOMETRY
OD_K2POWER_DIOPTERS: 42.75
OD_AXISANGLE_DEGREES: 159
OS_K1POWER_DIOPTERS: 41.50
OS_AXISANGLE_DEGREES: 95
OS_K2POWER_DIOPTERS: 42.25
OD_K1POWER_DIOPTERS: 42.25

## 2025-05-05 ASSESSMENT — REFRACTION_CURRENTRX
OD_CYLINDER: +2.25
OS_CYLINDER: +1.75
OD_SPHERE: -3.50
OD_AXIS: 105
OS_AXIS: 45
OD_OVR_VA: 20/
OS_SPHERE: -3.25
OS_OVR_VA: 20/

## 2025-05-05 ASSESSMENT — REFRACTION_MANIFEST
OS_SPHERE: -2.00
OU_VA: 20/30+1
OS_CYLINDER: +1.00
OD_VA1: 20/100-2
OD_VA1: 20/50+2
OS_SPHERE: -2.25
OD_SPHERE: PLANO
OS_VA1: 20/30
OS_VA1: 20/25-2
OD_CYLINDER: +3.00
OD_AXIS: 170
OS_AXIS: 060
OD_CYLINDER: +0.75
OD_SPHERE: -6.00
OS_AXIS: 45
OD_AXIS: 155
OS_CYLINDER: +0.75

## 2025-05-05 ASSESSMENT — REFRACTION_AUTOREFRACTION
OD_SPHERE: -0.50
OS_AXIS: 057
OS_SPHERE: -2.00
OS_CYLINDER: +1.00
OD_CYLINDER: +1.00
OD_AXIS: 168

## 2025-05-05 ASSESSMENT — VISUAL ACUITY
OD_BCVA: 20/30+1
OS_BCVA: 20/60

## 2025-05-13 ENCOUNTER — APPOINTMENT (OUTPATIENT)
Dept: PULMONOLOGY | Facility: CLINIC | Age: 85
End: 2025-05-13
Payer: MEDICARE

## 2025-05-13 VITALS
TEMPERATURE: 97.2 F | HEIGHT: 67 IN | SYSTOLIC BLOOD PRESSURE: 112 MMHG | DIASTOLIC BLOOD PRESSURE: 58 MMHG | BODY MASS INDEX: 29.82 KG/M2 | WEIGHT: 190 LBS | OXYGEN SATURATION: 94 % | HEART RATE: 65 BPM

## 2025-05-13 VITALS — OXYGEN SATURATION: 81 % | HEART RATE: 69 BPM

## 2025-05-13 DIAGNOSIS — J43.2 CENTRILOBULAR EMPHYSEMA: ICD-10-CM

## 2025-05-13 DIAGNOSIS — R06.09 OTHER FORMS OF DYSPNEA: ICD-10-CM

## 2025-05-13 PROCEDURE — 99214 OFFICE O/P EST MOD 30 MIN: CPT

## 2025-06-03 ENCOUNTER — OFFICE (OUTPATIENT)
Facility: LOCATION | Age: 85
Setting detail: OPHTHALMOLOGY
End: 2025-06-03
Payer: MEDICARE

## 2025-06-03 DIAGNOSIS — Z96.1: ICD-10-CM

## 2025-06-03 PROBLEM — H18.511 ENDOTHELIAL CORNEAL DYSTROPHY;  ,,  , RIGHT EYE: Status: ACTIVE | Noted: 2025-06-03

## 2025-06-03 PROCEDURE — 99024 POSTOP FOLLOW-UP VISIT: CPT | Performed by: OPHTHALMOLOGY

## 2025-06-03 ASSESSMENT — REFRACTION_MANIFEST
OD_CYLINDER: +3.00
OU_VA: 20/30+1
OS_SPHERE: -2.25
OS_CYLINDER: +0.50
OD_SPHERE: -6.00
OS_VA1: 20/25-2
OS_VA1: 20/30
OD_AXIS: 170
OD_VA1: 20/40+2
OD_VA1: 20/100-2
OD_SPHERE: -1.00
OS_VA1: 20/30-2
OS_AXIS: 060
OS_CYLINDER: +1.00
OD_CYLINDER: +0.25
OS_AXIS: 45
OS_CYLINDER: +0.75
OD_VA1: 20/50+2
OD_AXIS: 175
OD_AXIS: 155
OS_AXIS: 060
OD_CYLINDER: +0.75
OD_SPHERE: PLANO
OS_SPHERE: -2.00
OS_SPHERE: -2.50

## 2025-06-03 ASSESSMENT — KERATOMETRY
OS_K1POWER_DIOPTERS: 42.00
OD_K2POWER_DIOPTERS: 43.00
OS_AXISANGLE_DEGREES: 085
OD_AXISANGLE_DEGREES: 148
OD_K1POWER_DIOPTERS: 42.00
OS_K2POWER_DIOPTERS: 42.25

## 2025-06-03 ASSESSMENT — REFRACTION_CURRENTRX
OD_SPHERE: -3.50
OS_CYLINDER: +1.75
OS_AXIS: 45
OS_SPHERE: -3.25
OD_CYLINDER: +2.25
OS_OVR_VA: 20/
OD_OVR_VA: 20/
OD_AXIS: 105

## 2025-06-03 ASSESSMENT — CORNEAL DYSTROPHY - POSTERIOR
OD_POSTERIORDYSTROPHY: 1+ 2+
OS_POSTERIORDYSTROPHY: 1+ 2+

## 2025-06-03 ASSESSMENT — REFRACTION_AUTOREFRACTION
OS_CYLINDER: +1.00
OD_AXIS: 168
OD_SPHERE: -1.00
OD_CYLINDER: +0.50
OS_AXIS: 057
OS_SPHERE: -2.00

## 2025-06-03 ASSESSMENT — TONOMETRY
OS_IOP_MMHG: 12
OD_IOP_MMHG: 12

## 2025-06-03 ASSESSMENT — VISUAL ACUITY
OS_BCVA: 20/60-2
OD_BCVA: 20/40

## 2025-07-22 ENCOUNTER — OFFICE (OUTPATIENT)
Facility: LOCATION | Age: 85
Setting detail: OPHTHALMOLOGY
End: 2025-07-22
Payer: MEDICARE

## 2025-07-22 DIAGNOSIS — H25.12: ICD-10-CM

## 2025-07-22 PROCEDURE — 92136 OPHTHALMIC BIOMETRY: CPT | Mod: LT | Performed by: OPHTHALMOLOGY

## 2025-07-22 PROCEDURE — 99213 OFFICE O/P EST LOW 20 MIN: CPT | Performed by: OPHTHALMOLOGY

## 2025-07-22 ASSESSMENT — KERATOMETRY
OS_K1K2_AVERAGE: 41.875
OD_K1K2_AVERAGE: 42.875
OS_AXISANGLE2_DEGREES: 089
OS_CYLAXISANGLE_DEGREES: 89
OS_K2POWER_DIOPTERS: 42.00
OD_AXISANGLE_DEGREES: 159
OS_CYLPOWER_DEGREES: 0.25
OD_K2POWER_DIOPTERS: 43.25
OD_CYLAXISANGLE_DEGREES: 159
OS_K1POWER_DIOPTERS: 41.75
OD_K1POWER_DIOPTERS: 42.50
OD_AXISANGLE2_DEGREES: 159
OS_AXISANGLE_DEGREES: 089
OD_CYLPOWER_DEGREES: 0.75

## 2025-07-22 ASSESSMENT — LID POSITION - ECTROPION: OD_ECTROPION: RLL

## 2025-07-22 ASSESSMENT — CORNEAL DYSTROPHY - POSTERIOR
OS_POSTERIORDYSTROPHY: 1+ 2+
OD_POSTERIORDYSTROPHY: 1+ 2+

## 2025-07-23 ASSESSMENT — REFRACTION_CURRENTRX
OD_CYLINDER: +2.25
OS_AXIS: 45
OD_SPHERE: -3.50
OS_OVR_VA: 20/
OD_AXIS: 105
OS_SPHERE: -3.25
OS_CYLINDER: +1.75
OD_OVR_VA: 20/

## 2025-07-23 ASSESSMENT — REFRACTION_MANIFEST
OD_AXIS: 175
OS_VA1: 20/25-2
OD_SPHERE: -6.00
OS_SPHERE: -2.00
OD_VA1: 20/50+2
OS_AXIS: 45
OD_SPHERE: -1.00
OD_SPHERE: PLANO
OD_VA1: 20/100-2
OS_SPHERE: -2.50
OU_VA: 20/30+1
OS_CYLINDER: +0.50
OD_AXIS: 155
OD_CYLINDER: +0.75
OD_AXIS: 170
OS_CYLINDER: +0.75
OS_CYLINDER: +1.00
OS_SPHERE: -2.25
OS_VA1: 20/30
OD_CYLINDER: +3.00
OS_AXIS: 060
OS_VA1: 20/30-2
OD_CYLINDER: +0.25
OD_VA1: 20/40+2
OS_AXIS: 060

## 2025-07-23 ASSESSMENT — REFRACTION_AUTOREFRACTION
OD_AXIS: 173
OD_SPHERE: -1.00
OS_AXIS: 057
OD_CYLINDER: +1.00
OS_CYLINDER: +1.00
OS_SPHERE: -2.00

## 2025-07-23 ASSESSMENT — KERATOMETRY
OD_K2POWER_DIOPTERS: 43.25
OS_K2POWER_DIOPTERS: 42.00
OS_AXISANGLE_DEGREES: 089
OD_K1POWER_DIOPTERS: 42.50
OS_K1POWER_DIOPTERS: 41.75
OD_AXISANGLE_DEGREES: 159

## 2025-07-23 ASSESSMENT — VISUAL ACUITY
OS_BCVA: 20/50+2
OD_BCVA: 20/30

## 2025-09-08 ENCOUNTER — APPOINTMENT (OUTPATIENT)
Dept: PULMONOLOGY | Facility: CLINIC | Age: 85
End: 2025-09-08
Payer: MEDICARE

## 2025-09-08 VITALS
TEMPERATURE: 97.6 F | WEIGHT: 191 LBS | DIASTOLIC BLOOD PRESSURE: 48 MMHG | HEIGHT: 67 IN | SYSTOLIC BLOOD PRESSURE: 112 MMHG | HEART RATE: 73 BPM | OXYGEN SATURATION: 95 % | BODY MASS INDEX: 29.98 KG/M2

## 2025-09-08 DIAGNOSIS — J44.9 CHRONIC OBSTRUCTIVE PULMONARY DISEASE, UNSPECIFIED: ICD-10-CM

## 2025-09-08 DIAGNOSIS — R06.09 OTHER FORMS OF DYSPNEA: ICD-10-CM

## 2025-09-08 PROCEDURE — 99214 OFFICE O/P EST MOD 30 MIN: CPT

## 2025-09-08 RX ORDER — BUDESONIDE AND FORMOTEROL FUMARATE DIHYDRATE 160; 4.5 UG/1; UG/1
160-4.5 AEROSOL RESPIRATORY (INHALATION) TWICE DAILY
Qty: 1 | Refills: 6 | Status: ACTIVE | COMMUNITY
Start: 2025-09-08 | End: 1900-01-01